# Patient Record
Sex: MALE | Race: WHITE | NOT HISPANIC OR LATINO | Employment: UNEMPLOYED | URBAN - METROPOLITAN AREA
[De-identification: names, ages, dates, MRNs, and addresses within clinical notes are randomized per-mention and may not be internally consistent; named-entity substitution may affect disease eponyms.]

---

## 2019-05-16 ENCOUNTER — HOSPITAL ENCOUNTER (OUTPATIENT)
Dept: GENERAL RADIOLOGY | Facility: HOSPITAL | Age: 49
Discharge: HOME | End: 2019-05-16

## 2020-07-05 ENCOUNTER — HOSPITAL ENCOUNTER (EMERGENCY)
Facility: HOSPITAL | Age: 50
Discharge: HOME | End: 2020-07-05
Attending: EMERGENCY MEDICINE | Admitting: EMERGENCY MEDICINE

## 2020-07-05 VITALS
HEART RATE: 71 BPM | RESPIRATION RATE: 18 BRPM | OXYGEN SATURATION: 96 % | HEIGHT: 70 IN | WEIGHT: 210 LBS | DIASTOLIC BLOOD PRESSURE: 79 MMHG | SYSTOLIC BLOOD PRESSURE: 120 MMHG | TEMPERATURE: 98.2 F | BODY MASS INDEX: 30.06 KG/M2

## 2020-07-05 DIAGNOSIS — T67.5XXA HEAT EXHAUSTION, INITIAL ENCOUNTER: Primary | ICD-10-CM

## 2020-07-05 LAB
ALBUMIN SERPL-MCNC: 4.7 G/DL (ref 3.5–5.2)
ALBUMIN/GLOB SERPL: 1.4 G/DL
ALP SERPL-CCNC: 101 U/L (ref 39–117)
ALT SERPL W P-5'-P-CCNC: 18 U/L (ref 1–41)
ANION GAP SERPL CALCULATED.3IONS-SCNC: 14 MMOL/L (ref 5–15)
APTT PPP: 27.9 SECONDS (ref 24–31)
AST SERPL-CCNC: 18 U/L (ref 1–40)
BASOPHILS # BLD AUTO: 0.1 10*3/MM3 (ref 0–0.2)
BASOPHILS NFR BLD AUTO: 0.9 % (ref 0–1.5)
BILIRUB SERPL-MCNC: 1.1 MG/DL (ref 0.2–1.2)
BUN SERPL-MCNC: 15 MG/DL (ref 6–20)
BUN SERPL-MCNC: ABNORMAL MG/DL
BUN/CREAT SERPL: ABNORMAL
CALCIUM SPEC-SCNC: 10 MG/DL (ref 8.6–10.5)
CHLORIDE SERPL-SCNC: 106 MMOL/L (ref 98–107)
CK SERPL-CCNC: 118 U/L (ref 20–200)
CO2 SERPL-SCNC: 23 MMOL/L (ref 22–29)
CREAT SERPL-MCNC: 1.16 MG/DL (ref 0.76–1.27)
DEPRECATED RDW RBC AUTO: 45.9 FL (ref 37–54)
EOSINOPHIL # BLD AUTO: 0.2 10*3/MM3 (ref 0–0.4)
EOSINOPHIL NFR BLD AUTO: 1.9 % (ref 0.3–6.2)
ERYTHROCYTE [DISTWIDTH] IN BLOOD BY AUTOMATED COUNT: 14.2 % (ref 12.3–15.4)
GFR SERPL CREATININE-BSD FRML MDRD: 66 ML/MIN/1.73
GLOBULIN UR ELPH-MCNC: 3.3 GM/DL
GLUCOSE SERPL-MCNC: 134 MG/DL (ref 65–99)
HCT VFR BLD AUTO: 46.2 % (ref 37.5–51)
HGB BLD-MCNC: 15.6 G/DL (ref 13–17.7)
HOLD SPECIMEN: NORMAL
INR PPP: 1.17 (ref 0.9–1.1)
LYMPHOCYTES # BLD AUTO: 1.8 10*3/MM3 (ref 0.7–3.1)
LYMPHOCYTES NFR BLD AUTO: 21.7 % (ref 19.6–45.3)
MCH RBC QN AUTO: 30.8 PG (ref 26.6–33)
MCHC RBC AUTO-ENTMCNC: 33.7 G/DL (ref 31.5–35.7)
MCV RBC AUTO: 91.4 FL (ref 79–97)
MONOCYTES # BLD AUTO: 0.4 10*3/MM3 (ref 0.1–0.9)
MONOCYTES NFR BLD AUTO: 5.2 % (ref 5–12)
NEUTROPHILS NFR BLD AUTO: 5.8 10*3/MM3 (ref 1.7–7)
NEUTROPHILS NFR BLD AUTO: 70.3 % (ref 42.7–76)
NRBC BLD AUTO-RTO: 0.1 /100 WBC (ref 0–0.2)
PLATELET # BLD AUTO: 280 10*3/MM3 (ref 140–450)
PMV BLD AUTO: 6.7 FL (ref 6–12)
POTASSIUM SERPL-SCNC: 3.5 MMOL/L (ref 3.5–5.2)
PROT SERPL-MCNC: 8 G/DL (ref 6–8.5)
PROTHROMBIN TIME: 11.9 SECONDS (ref 9.6–11.7)
RBC # BLD AUTO: 5.05 10*6/MM3 (ref 4.14–5.8)
SODIUM SERPL-SCNC: 143 MMOL/L (ref 136–145)
TROPONIN T SERPL-MCNC: <0.01 NG/ML (ref 0–0.03)
WBC # BLD AUTO: 8.2 10*3/MM3 (ref 3.4–10.8)

## 2020-07-05 PROCEDURE — 85025 COMPLETE CBC W/AUTO DIFF WBC: CPT | Performed by: EMERGENCY MEDICINE

## 2020-07-05 PROCEDURE — 82550 ASSAY OF CK (CPK): CPT | Performed by: EMERGENCY MEDICINE

## 2020-07-05 PROCEDURE — 25010000002 THIAMINE PER 100 MG: Performed by: EMERGENCY MEDICINE

## 2020-07-05 PROCEDURE — 99284 EMERGENCY DEPT VISIT MOD MDM: CPT

## 2020-07-05 PROCEDURE — 80053 COMPREHEN METABOLIC PANEL: CPT | Performed by: EMERGENCY MEDICINE

## 2020-07-05 PROCEDURE — 85730 THROMBOPLASTIN TIME PARTIAL: CPT | Performed by: EMERGENCY MEDICINE

## 2020-07-05 PROCEDURE — 84484 ASSAY OF TROPONIN QUANT: CPT | Performed by: EMERGENCY MEDICINE

## 2020-07-05 PROCEDURE — 96365 THER/PROPH/DIAG IV INF INIT: CPT

## 2020-07-05 PROCEDURE — 85610 PROTHROMBIN TIME: CPT | Performed by: EMERGENCY MEDICINE

## 2020-07-05 PROCEDURE — 25010000002 ONDANSETRON PER 1 MG: Performed by: EMERGENCY MEDICINE

## 2020-07-05 PROCEDURE — 96375 TX/PRO/DX INJ NEW DRUG ADDON: CPT

## 2020-07-05 RX ORDER — ONDANSETRON 2 MG/ML
8 INJECTION INTRAMUSCULAR; INTRAVENOUS ONCE
Status: COMPLETED | OUTPATIENT
Start: 2020-07-05 | End: 2020-07-05

## 2020-07-05 RX ADMIN — SODIUM CHLORIDE 100 MG: 9 INJECTION, SOLUTION INTRAVENOUS at 14:59

## 2020-07-05 RX ADMIN — SODIUM CHLORIDE, SODIUM LACTATE, POTASSIUM CHLORIDE, AND CALCIUM CHLORIDE 2000 ML: 600; 310; 30; 20 INJECTION, SOLUTION INTRAVENOUS at 13:32

## 2020-07-05 RX ADMIN — ONDANSETRON 8 MG: 2 INJECTION INTRAMUSCULAR; INTRAVENOUS at 13:32

## 2020-07-05 NOTE — ED TRIAGE NOTES
Pt here via [facility]; c/o feeling sleepy, achy, nauseous s/p walking from [place] to [place], drinking water then feeling dizzy with n/v just prior to arrival.

## 2020-07-05 NOTE — ED PROVIDER NOTES
Subjective   50-year-old male states that he walked from [place] to [place].  He states that he was sweating heavily and did not have oral intake.  The patient reports he was lightheaded and nauseated.  He stated he had some mild muscle cramping.  The heat index was over 90 and he states he was walking for about 3 hours.  He reports no chest pain or palpitations.  Reports no headache or neck stiffness.  He denies substance abuse or alcohol intake.  He states that he is noncompliant with all of his medication          Review of Systems   Constitutional: Positive for diaphoresis and fatigue. Negative for chills and fever.   HENT: Negative for trouble swallowing.    Respiratory: Negative for cough, choking, chest tightness, shortness of breath, wheezing and stridor.    Cardiovascular: Negative for palpitations and leg swelling.   Gastrointestinal: Positive for nausea. Negative for abdominal pain.   Genitourinary: Negative for testicular pain.   Musculoskeletal: Negative for back pain.   All other systems reviewed and are negative.      Past Medical History:   Diagnosis Date   • Coronary artery disease        No Known Allergies    Past Surgical History:   Procedure Laterality Date   • CORONARY ANGIOPLASTY WITH STENT PLACEMENT         History reviewed. No pertinent family history.    Social History     Socioeconomic History   • Marital status: Legally      Spouse name: Not on file   • Number of children: Not on file   • Years of education: Not on file   • Highest education level: Not on file   Tobacco Use   • Smoking status: Current Every Day Smoker     Packs/day: 0.50     Types: Cigarettes   Substance and Sexual Activity   • Alcohol use: Yes     Comment: occasional   • Drug use: Yes     Types: Marijuana     Comment: occasional   • Sexual activity: Defer           Objective   Physical Exam  Alert [place] Coma Scale 15   HEENT: Pupils equal and reactive to light. Conjunctivae are not injected. normal  tympanic membranes. Oropharynx and nares are normal.   Neck: Supple. Midline trachea. No JVD. No goiter.   Chest: Clear and equal breath sounds bilaterally regular rate and rhythm without murmur or rub.   Abdomen: Positive bowel sounds nontender nondistended. No rebound or peritoneal signs. No CVA tenderness.   Extremities no clubbing cyanosis or edema motor sensory exam is normal the full range of motion is intact.  Major muscle bellies are nontender   skin: Warm and dry, no rashes or petechia.   Lymphatic: No regional lymphadenopathy. No calf pain, swelling or Kindra's sign    Procedures           ED Course  ED Course as of Jul 05 1548   Sun Jul 05, 2020   1537 I examined the patient using the appropriate personal protective equipment.      Homeless?    [TH]      ED Course User Index  [TH] Florencio Wilson MD           Labs Reviewed   COMPREHENSIVE METABOLIC PANEL - Abnormal; Notable for the following components:       Result Value    Glucose 134 (*)     All other components within normal limits    Narrative:     GFR Normal >60  Chronic Kidney Disease <60  Kidney Failure <15     PROTIME-INR - Abnormal; Notable for the following components:    Protime 11.9 (*)     INR 1.17 (*)     All other components within normal limits   TROPONIN (IN-HOUSE) - Normal    Narrative:     Troponin T Reference Range:  <= 0.03 ng/mL-   Negative for AMI  >0.03 ng/mL-     Abnormal for myocardial necrosis.  Clinicians would have to utilize clinical acumen, EKG, Troponin and serial changes to determine if it is an Acute Myocardial Infarction or myocardial injury due to an underlying chronic condition.       Results may be falsely decreased if patient taking Biotin.     CK - Normal   APTT - Normal   CBC WITH AUTO DIFFERENTIAL - Normal   BUN - Normal   CBC AND DIFFERENTIAL    Narrative:     The following orders were created for panel order CBC & Differential.  Procedure                               Abnormality         Status                      ---------                               -----------         ------                     CBC Auto Differential[[id number]]        Normal              Final result                 Please view results for these tests on the individual orders.   EXTRA TUBES    Narrative:     The following orders were created for panel order Extra Tubes.  Procedure                               Abnormality         Status                     ---------                               -----------         ------                     Gold Top - SST[[id number]]                                   Final result                 Please view results for these tests on the individual orders.   GOLD TOP - SST     Medications   thiamine (B-1) 100 mg in sodium chloride 0.9 % 100 mL IVPB (0 mg Intravenous Stopped 7/5/20 1536)   lactated ringers bolus 2,000 mL (0 mL Intravenous Stopped 7/5/20 1536)   ondansetron (ZOFRAN) injection 8 mg (8 mg Intravenous Given 7/5/20 1332)                                 MDM  Number of Diagnoses or Management Options     Amount and/or Complexity of Data Reviewed  Clinical lab tests: reviewed  Review and summarize past medical records: yes  Discuss the patient with other providers: yes  Independent visualization of images, tracings, or specimens: yes    Risk of Complications, Morbidity, and/or Mortality  Presenting problems: high  Diagnostic procedures: high  Management options: high  General comments: Stable ER course.  Patient was discharged.  He was given a referral to the on-call primary care provider and also advised of the [facility] clinic.  Importance of medical therapy for coronary artery disease was specifically discussed.  He is encouraged to take a daily baby aspirin.  He was advised to avoid heat exposure, work or becoming dehydrated for the next 2 days.  The patient was stable at discharge and vocalized understanding of discharge instructions and warning        Final diagnoses:   Heat exhaustion, initial  encounter            Florencio Wilson MD  07/05/20 1544

## 2020-07-05 NOTE — DISCHARGE INSTRUCTIONS
Rest next 48 hours, avoid heat exposure, work  Plenty of fluids next 48 hours  Tylenol as needed for discomfort  You need to take a baby aspirin every day  Follow-up is important to get back on your medication, call clinics above

## 2021-09-11 ENCOUNTER — HOSPITAL ENCOUNTER (EMERGENCY)
Facility: HOSPITAL | Age: 51
Discharge: HOME | End: 2021-09-11
Attending: EMERGENCY MEDICINE | Admitting: EMERGENCY MEDICINE

## 2021-09-11 ENCOUNTER — APPOINTMENT (OUTPATIENT)
Dept: CT IMAGING | Facility: HOSPITAL | Age: 51
End: 2021-09-11

## 2021-09-11 VITALS
HEART RATE: 81 BPM | TEMPERATURE: 97.8 F | OXYGEN SATURATION: 100 % | DIASTOLIC BLOOD PRESSURE: 74 MMHG | BODY MASS INDEX: 29.63 KG/M2 | HEIGHT: 70 IN | WEIGHT: 207 LBS | RESPIRATION RATE: 16 BRPM | SYSTOLIC BLOOD PRESSURE: 110 MMHG

## 2021-09-11 DIAGNOSIS — M54.42 ACUTE LEFT-SIDED LOW BACK PAIN WITH LEFT-SIDED SCIATICA: Primary | ICD-10-CM

## 2021-09-11 PROCEDURE — 25010000002 HYDROMORPHONE PER 4 MG: Performed by: EMERGENCY MEDICINE

## 2021-09-11 PROCEDURE — 63710000001 ONDANSETRON ODT 4 MG TABLET DISPERSIBLE: Performed by: EMERGENCY MEDICINE

## 2021-09-11 PROCEDURE — 25010000002 METHYLPREDNISOLONE PER 80 MG: Performed by: EMERGENCY MEDICINE

## 2021-09-11 PROCEDURE — 96372 THER/PROPH/DIAG INJ SC/IM: CPT

## 2021-09-11 PROCEDURE — 99283 EMERGENCY DEPT VISIT LOW MDM: CPT

## 2021-09-11 PROCEDURE — 72131 CT LUMBAR SPINE W/O DYE: CPT

## 2021-09-11 RX ORDER — CYCLOBENZAPRINE HCL 10 MG
10 TABLET ORAL 3 TIMES DAILY PRN
Qty: 12 TABLET | Refills: 0 | Status: SHIPPED | OUTPATIENT
Start: 2021-09-11

## 2021-09-11 RX ORDER — HYDROMORPHONE HCL 110MG/55ML
1 PATIENT CONTROLLED ANALGESIA SYRINGE INTRAVENOUS ONCE
Status: COMPLETED | OUTPATIENT
Start: 2021-09-11 | End: 2021-09-11

## 2021-09-11 RX ORDER — ONDANSETRON 4 MG/1
4 TABLET, ORALLY DISINTEGRATING ORAL ONCE
Status: COMPLETED | OUTPATIENT
Start: 2021-09-11 | End: 2021-09-11

## 2021-09-11 RX ORDER — HYDROCODONE BITARTRATE AND ACETAMINOPHEN 5; 325 MG/1; MG/1
1 TABLET ORAL EVERY 6 HOURS PRN
Qty: 12 TABLET | Refills: 0 | Status: SHIPPED | OUTPATIENT
Start: 2021-09-11

## 2021-09-11 RX ORDER — MELOXICAM 7.5 MG/1
7.5 TABLET ORAL DAILY
Qty: 12 TABLET | Refills: 0 | Status: SHIPPED | OUTPATIENT
Start: 2021-09-11

## 2021-09-11 RX ORDER — METHYLPREDNISOLONE ACETATE 80 MG/ML
80 INJECTION, SUSPENSION INTRA-ARTICULAR; INTRALESIONAL; INTRAMUSCULAR; SOFT TISSUE ONCE
Status: COMPLETED | OUTPATIENT
Start: 2021-09-11 | End: 2021-09-11

## 2021-09-11 RX ADMIN — METHYLPREDNISOLONE ACETATE 80 MG: 80 INJECTION, SUSPENSION INTRA-ARTICULAR; INTRALESIONAL; INTRAMUSCULAR; SOFT TISSUE at 22:28

## 2021-09-11 RX ADMIN — ONDANSETRON 4 MG: 4 TABLET, ORALLY DISINTEGRATING ORAL at 19:47

## 2021-09-11 RX ADMIN — HYDROMORPHONE HYDROCHLORIDE 1 MG: 2 INJECTION, SOLUTION INTRAMUSCULAR; INTRAVENOUS; SUBCUTANEOUS at 19:47

## 2021-09-12 NOTE — DISCHARGE INSTRUCTIONS
Heating pad no heavy lifting  Return for loss of bladder bowel control abdominal pain black or bloody stool bloody urine dizziness passing out weakness numbness to extremity unable to walk uncontrolled pain fevers or chills or any other new or worse problems or concerns  Mobic Flexeril [place] sent to your pharmacy

## 2021-09-12 NOTE — ED PROVIDER NOTES
Subjective   Chief complaint severe lower back pain into left hip    History of present illness this is a 51-year-old male who states he has had several fractures in his lower back who was lifting up the freezer today when he felt a pop and had severe pain in his lower back to his left hip.  This happened several hours ago and is progressively gotten worse.  Is aching throbbing in nature worse with movement better with rest.  No fever no chills no night sweats no loss of bladder bowel control no numbness or tingling.  No abdominal pain dizziness or syncope.  No black or bloody stool no urinary problems no other associated symptoms.          Review of Systems   Constitutional: Negative for chills and fever.   Eyes: Negative for photophobia and visual disturbance.   Respiratory: Negative for chest tightness and shortness of breath.    Gastrointestinal: Negative for abdominal pain, blood in stool and vomiting.   Genitourinary: Negative for difficulty urinating and dysuria.   Musculoskeletal: Positive for back pain. Negative for neck pain.   Skin: Negative for color change and rash.   Neurological: Negative for dizziness and light-headedness.   Psychiatric/Behavioral: Negative for agitation and behavioral problems.       Past Medical History:   Diagnosis Date   • Coronary artery disease        No Known Allergies    Past Surgical History:   Procedure Laterality Date   • CORONARY ANGIOPLASTY WITH STENT PLACEMENT         No family history on file.    Social History     Socioeconomic History   • Marital status: Legally      Spouse name: Not on file   • Number of children: Not on file   • Years of education: Not on file   • Highest education level: Not on file   Tobacco Use   • Smoking status: Current Every Day Smoker     Packs/day: 0.50     Types: Cigarettes   Substance and Sexual Activity   • Alcohol use: Yes     Comment: occasional   • Drug use: Yes     Types: Marijuana     Comment: occasional   • Sexual  activity: Defer     Prior to Admission medications    Medication Sig Start Date End Date Taking? Authorizing Provider   cyclobenzaprine (FLEXERIL) 10 MG tablet Take 1 tablet by mouth 3 (Three) Times a Day As Needed for Muscle Spasms. 9/11/21   Martir Oliveros MD   HYDROcodone-acetaminophen (NORCO) 5-325 MG per tablet Take 1 tablet by mouth Every 6 (Six) Hours As Needed for Severe Pain . 9/11/21   Martir Oliveros MD   meloxicam (MOBIC) 7.5 MG tablet Take 1 tablet by mouth Daily. 9/11/21   Martir Oliveros MD         Patient takes no home medication  Objective   Physical Exam  51-year-old awake alert no acute distress.  But moderate pain he is walking in the room without difficulty  HEENT extraocular muscles are intact pupils equal round reactive sclera clear  Neck supple no adenopathy no JVD  Lungs clear no retraction  Heart regular without murmur  Back no CVA tenderness no direct cervical thoracic lumbar spine tenderness noted no crepitus no subcutaneous air is no pain to palpation percussion.  There is some muscle spasm noted to the left paralumbar area into the left posterior hip buttock area is not red or hot or fevers.  There is no saddle anesthesias good sensation is noted throughout this entire area.  Extremities pulses equal upper and lower extremities no edema cords or Homans' sign or evidence of DVT.  Skin warm dry without rashes or cellulitic change.  Range of motion patient has a hard time straightening out and hard time bending over side side motor somewhat limited.  Is reproducible worse with activity.  Neurologic awake alert follows commands motor strength normal no facial asymmetry normal speech he walks without ataxia.  Negative straight leg testing toes up-and-down including big toe dorsiflex plantarflex at difficulty.  Good sensation throughout the legs and saddle area reflexes noted 1+ bilateral knees and ankles.  Motor strength normal.  Abdomen soft nontender good bowel sounds no peritoneal  findings or pulsatile mass or bruits no hernias.  Procedures           ED Course        No radiology results for the last day  Medications   methylPREDNISolone acetate (DEPO-medrol) injection 80 mg (has no administration in time range)   ondansetron ODT (ZOFRAN-ODT) disintegrating tablet 4 mg (4 mg Oral Given 9/11/21 1947)   HYDROmorphone (DILAUDID) injection 1 mg (1 mg Intramuscular Given 9/11/21 1947)                                            MDM  Number of Diagnoses or Management Options  Acute left-sided low back pain with left-sided sciatica: new and requires workup  Diagnosis management comments: Medical decision made.  Patient had the above exam evaluation given Dilaudid 1 mg IM Zofran 4 mg p.o. and Depo-Medrol 80 mg IM.  Patient underwent a CT scan no fracture was noted.  Minimal broad-based disc protrusion at T12-L1 minimal broad-based protrusion L1-L2 minimal broad-based protrusion L2-L3 mild facet arthropathy.  Mild facet arthropathy and mild broad based protrusion at L3-L4 some mild canal narrowing of the thecal sac at 8 mm at the L3-L4 area.  These degenerative changes and mild disc protrusions occur at L4 L5-S1 as well with some mild bilateral foraminal narrowing and AP thecal sac diameter of 8 mm.  Patient repeat exam is feeling better.  There is no evidence of cord compression no evidence suggest epidural abscess by history and no evidence of spinal cord lesion currently by my findings.  No evidence of acute intra-abdominal process no evidence suggest acute aortic dissection or aneurysm.  He has good and equal pulses are his upper extremities motor strength normal is no pulsatile masses and no pain throughout the abdomen this is reproducible pain in the lower lumbar area into the left hip and is worse with movement.  Patient was feeling better he was referred for outpatient neurosurgery and family doctor we talked about what to return for and he voices understanding inspect reviewed by me was  unremarkable.  Patient was given Norco instructed on appropriate use of that and also Mobic just for short-term for the next few days and a muscle relaxer stable unremarkable ER course improved       Amount and/or Complexity of Data Reviewed  Tests in the radiology section of CPT®: reviewed    Risk of Complications, Morbidity, and/or Mortality  Presenting problems: moderate  Diagnostic procedures: moderate  Management options: moderate        Final diagnoses:   Acute left-sided low back pain with left-sided sciatica       ED Disposition  ED Disposition     ED Disposition Condition Comment    Discharge Stable           PATIENT CONNECTION - [facility] [address]  [phone]  In 2 days      Brad Decker MD  [facility] [address] [place] [address]  [phone]    In 1 week           Medication List      New Prescriptions    cyclobenzaprine 10 MG tablet  Commonly known as: FLEXERIL  Take 1 tablet by mouth 3 (Three) Times a Day As Needed for Muscle Spasms.     HYDROcodone-acetaminophen 5-325 MG per tablet  Commonly known as: NORCO  Take 1 tablet by mouth Every 6 (Six) Hours As Needed for Severe Pain .     meloxicam 7.5 MG tablet  Commonly known as: MOBIC  Take 1 tablet by mouth Daily.           Where to Get Your Medications      These medications were sent to [id number] - [place], [place] - [address] AT [facility] - [phone]  - [phone] FX  [address] [place] [address]    Phone: [phone]   · cyclobenzaprine 10 MG tablet  · HYDROcodone-acetaminophen 5-325 MG per tablet  · meloxicam 7.5 MG tablet          Martir Oliveros MD  09/11/21 5055

## 2023-02-08 ENCOUNTER — APPOINTMENT (OUTPATIENT)
Dept: GENERAL RADIOLOGY | Facility: HOSPITAL | Age: 53
End: 2023-02-08
Payer: MEDICAID

## 2023-02-08 ENCOUNTER — HOSPITAL ENCOUNTER (EMERGENCY)
Facility: HOSPITAL | Age: 53
Discharge: LEFT BEFORE DISCHARGE | End: 2023-02-08
Attending: EMERGENCY MEDICINE | Admitting: EMERGENCY MEDICINE
Payer: MEDICAID

## 2023-02-08 VITALS
HEIGHT: 71 IN | OXYGEN SATURATION: 99 % | BODY MASS INDEX: 25.06 KG/M2 | TEMPERATURE: 98 F | RESPIRATION RATE: 15 BRPM | HEART RATE: 77 BPM | DIASTOLIC BLOOD PRESSURE: 78 MMHG | WEIGHT: 179 LBS | SYSTOLIC BLOOD PRESSURE: 133 MMHG

## 2023-02-08 LAB
ALBUMIN SERPL-MCNC: 3.8 G/DL (ref 3.5–5.2)
ALBUMIN/GLOB SERPL: 1.2 G/DL
ALP SERPL-CCNC: 82 U/L (ref 39–117)
ALT SERPL W P-5'-P-CCNC: 7 U/L (ref 1–41)
ANION GAP SERPL CALCULATED.3IONS-SCNC: 12 MMOL/L (ref 5–15)
AST SERPL-CCNC: 10 U/L (ref 1–40)
BASOPHILS # BLD AUTO: 0.1 10*3/MM3 (ref 0–0.2)
BASOPHILS NFR BLD AUTO: 1.4 % (ref 0–1.5)
BILIRUB SERPL-MCNC: 0.4 MG/DL (ref 0–1.2)
BUN SERPL-MCNC: 13 MG/DL (ref 6–20)
BUN/CREAT SERPL: 13.7 (ref 7–25)
CALCIUM SPEC-SCNC: 9.5 MG/DL (ref 8.6–10.5)
CHLORIDE SERPL-SCNC: 101 MMOL/L (ref 98–107)
CO2 SERPL-SCNC: 24 MMOL/L (ref 22–29)
CREAT SERPL-MCNC: 0.95 MG/DL (ref 0.76–1.27)
DEPRECATED RDW RBC AUTO: 43.8 FL (ref 37–54)
EGFRCR SERPLBLD CKD-EPI 2021: 95.1 ML/MIN/1.73
EOSINOPHIL # BLD AUTO: 0.3 10*3/MM3 (ref 0–0.4)
EOSINOPHIL NFR BLD AUTO: 3.2 % (ref 0.3–6.2)
ERYTHROCYTE [DISTWIDTH] IN BLOOD BY AUTOMATED COUNT: 14.2 % (ref 12.3–15.4)
GLOBULIN UR ELPH-MCNC: 3.1 GM/DL
GLUCOSE SERPL-MCNC: 100 MG/DL (ref 65–99)
HCT VFR BLD AUTO: 42.4 % (ref 37.5–51)
HGB BLD-MCNC: 13.8 G/DL (ref 13–17.7)
LYMPHOCYTES # BLD AUTO: 3.7 10*3/MM3 (ref 0.7–3.1)
LYMPHOCYTES NFR BLD AUTO: 36.3 % (ref 19.6–45.3)
MCH RBC QN AUTO: 29.1 PG (ref 26.6–33)
MCHC RBC AUTO-ENTMCNC: 32.6 G/DL (ref 31.5–35.7)
MCV RBC AUTO: 89.4 FL (ref 79–97)
MONOCYTES # BLD AUTO: 0.6 10*3/MM3 (ref 0.1–0.9)
MONOCYTES NFR BLD AUTO: 5.7 % (ref 5–12)
NEUTROPHILS NFR BLD AUTO: 5.4 10*3/MM3 (ref 1.7–7)
NEUTROPHILS NFR BLD AUTO: 53.4 % (ref 42.7–76)
NRBC BLD AUTO-RTO: 0 /100 WBC (ref 0–0.2)
NT-PROBNP SERPL-MCNC: 119.2 PG/ML (ref 0–900)
PLATELET # BLD AUTO: 423 10*3/MM3 (ref 140–450)
PMV BLD AUTO: 6.3 FL (ref 6–12)
POTASSIUM SERPL-SCNC: 4.2 MMOL/L (ref 3.5–5.2)
PROT SERPL-MCNC: 6.9 G/DL (ref 6–8.5)
QT INTERVAL: 396 MS
RBC # BLD AUTO: 4.74 10*6/MM3 (ref 4.14–5.8)
SODIUM SERPL-SCNC: 137 MMOL/L (ref 136–145)
TROPONIN T SERPL HS-MCNC: <6 NG/L
WBC NRBC COR # BLD: 10.1 10*3/MM3 (ref 3.4–10.8)

## 2023-02-08 PROCEDURE — 80053 COMPREHEN METABOLIC PANEL: CPT | Performed by: NURSE PRACTITIONER

## 2023-02-08 PROCEDURE — 83880 ASSAY OF NATRIURETIC PEPTIDE: CPT | Performed by: NURSE PRACTITIONER

## 2023-02-08 PROCEDURE — 93005 ELECTROCARDIOGRAM TRACING: CPT | Performed by: EMERGENCY MEDICINE

## 2023-02-08 PROCEDURE — 99283 EMERGENCY DEPT VISIT LOW MDM: CPT

## 2023-02-08 PROCEDURE — 93005 ELECTROCARDIOGRAM TRACING: CPT

## 2023-02-08 PROCEDURE — 84484 ASSAY OF TROPONIN QUANT: CPT | Performed by: NURSE PRACTITIONER

## 2023-02-08 PROCEDURE — 85025 COMPLETE CBC W/AUTO DIFF WBC: CPT | Performed by: NURSE PRACTITIONER

## 2023-02-08 PROCEDURE — 71045 X-RAY EXAM CHEST 1 VIEW: CPT

## 2023-02-08 RX ORDER — SODIUM CHLORIDE 0.9 % (FLUSH) 0.9 %
10 SYRINGE (ML) INJECTION AS NEEDED
Status: DISCONTINUED | OUTPATIENT
Start: 2023-02-08 | End: 2023-02-09 | Stop reason: HOSPADM

## 2023-02-08 NOTE — ED PROVIDER NOTES
Subjective      Provider in Triage Note  Patient is a 53-year-old gentleman who presents with chest pain nausea vomiting and body aches and pains that started this morning he arrived via EMS-    He states his pain is substernal he states that he has coronary artery disease and has had a stent placement in the past.      History of Present Illness    Review of Systems    Past Medical History:   Diagnosis Date   • Coronary artery disease        No Known Allergies    Past Surgical History:   Procedure Laterality Date   • CORONARY ANGIOPLASTY WITH STENT PLACEMENT         History reviewed. No pertinent family history.    Social History     Socioeconomic History   • Marital status: Legally    Tobacco Use   • Smoking status: Every Day     Packs/day: 0.50     Types: Cigarettes   Substance and Sexual Activity   • Alcohol use: Yes     Comment: occasional   • Drug use: Yes     Types: Marijuana     Comment: occasional   • Sexual activity: Defer           Objective   Physical Exam    Procedures           ED Course  ED Course as of 02/19/23 1100   Sun Feb 19, 2023   1100 Patient eloped prior to being laced in an ER room [KW]      ED Course User Index  [KW] Deb Jacques, APRN                                           [facility]    Final diagnoses:   None       ED Disposition  ED Disposition     ED Disposition   Eloped    Condition   --    Comment   --             No follow-up provider specified.       Medication List      No changes were made to your prescriptions during this visit.          Deb Jacques, APRN  02/19/23 1101

## 2023-06-11 ENCOUNTER — HOSPITAL ENCOUNTER (OUTPATIENT)
Facility: HOSPITAL | Age: 53
Setting detail: OBSERVATION
Discharge: HOME | End: 2023-06-12
Attending: EMERGENCY MEDICINE | Admitting: EMERGENCY MEDICINE
Payer: MEDICAID

## 2023-06-11 ENCOUNTER — APPOINTMENT (OUTPATIENT)
Dept: CT IMAGING | Facility: HOSPITAL | Age: 53
End: 2023-06-11
Payer: MEDICAID

## 2023-06-11 DIAGNOSIS — S00.03XA CONTUSION OF SCALP, INITIAL ENCOUNTER: ICD-10-CM

## 2023-06-11 DIAGNOSIS — D72.829 LEUKOCYTOSIS, UNSPECIFIED TYPE: ICD-10-CM

## 2023-06-11 DIAGNOSIS — R19.7 DIARRHEA, UNSPECIFIED TYPE: ICD-10-CM
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
## 2023-06-11 DIAGNOSIS — S20.219A CONTUSION OF CHEST WALL, UNSPECIFIED LATERALITY, INITIAL ENCOUNTER: ICD-10-CM

## 2023-06-11 DIAGNOSIS — R11.15 PERSISTENT VOMITING: Primary | ICD-10-CM

## 2023-06-11 DIAGNOSIS — S30.1XXA CONTUSION OF ABDOMINAL WALL, INITIAL ENCOUNTER: ICD-10-CM

## 2023-06-11 LAB
ALBUMIN SERPL-MCNC: 4.2 G/DL (ref 3.5–5.2)
ALBUMIN/GLOB SERPL: 0.9 G/DL
ALP SERPL-CCNC: 95 U/L (ref 39–117)
ALT SERPL W P-5'-P-CCNC: 27 U/L (ref 1–41)
AMYLASE SERPL-CCNC: 48 U/L (ref 28–100)
ANION GAP SERPL CALCULATED.3IONS-SCNC: 15 MMOL/L (ref 5–15)
AST SERPL-CCNC: 57 U/L (ref 1–40)
BACTERIA UR QL AUTO: ABNORMAL /HPF
BASOPHILS # BLD AUTO: 0.2 10*3/MM3 (ref 0–0.2)
BASOPHILS NFR BLD AUTO: 0.9 % (ref 0–1.5)
BILIRUB SERPL-MCNC: 1.4 MG/DL (ref 0–1.2)
BILIRUB UR QL STRIP: NEGATIVE
BUN SERPL-MCNC: 28 MG/DL (ref 6–20)
BUN/CREAT SERPL: 22.4 (ref 7–25)
CALCIUM SPEC-SCNC: 9.8 MG/DL (ref 8.6–10.5)
CHLORIDE SERPL-SCNC: 95 MMOL/L (ref 98–107)
CLARITY UR: CLEAR
CO2 SERPL-SCNC: 24 MMOL/L (ref 22–29)
COLOR UR: ABNORMAL
CREAT SERPL-MCNC: 1.25 MG/DL (ref 0.76–1.27)
DEPRECATED RDW RBC AUTO: 48.1 FL (ref 37–54)
EGFRCR SERPLBLD CKD-EPI 2021: 68.4 ML/MIN/1.73
EOSINOPHIL # BLD AUTO: 0 10*3/MM3 (ref 0–0.4)
EOSINOPHIL NFR BLD AUTO: 0.1 % (ref 0.3–6.2)
ERYTHROCYTE [DISTWIDTH] IN BLOOD BY AUTOMATED COUNT: 15.5 % (ref 12.3–15.4)
GLOBULIN UR ELPH-MCNC: 4.5 GM/DL
GLUCOSE SERPL-MCNC: 142 MG/DL (ref 65–99)
GLUCOSE UR STRIP-MCNC: NEGATIVE MG/DL
HCT VFR BLD AUTO: 38.8 % (ref 37.5–51)
HGB BLD-MCNC: 12.8 G/DL (ref 13–17.7)
HGB UR QL STRIP.AUTO: ABNORMAL
HYALINE CASTS UR QL AUTO: ABNORMAL /LPF
KETONES UR QL STRIP: ABNORMAL
LEUKOCYTE ESTERASE UR QL STRIP.AUTO: NEGATIVE
LIPASE SERPL-CCNC: 23 U/L (ref 13–60)
LYMPHOCYTES # BLD AUTO: 1.5 10*3/MM3 (ref 0.7–3.1)
LYMPHOCYTES NFR BLD AUTO: 6 % (ref 19.6–45.3)
MCH RBC QN AUTO: 28 PG (ref 26.6–33)
MCHC RBC AUTO-ENTMCNC: 32.9 G/DL (ref 31.5–35.7)
MCV RBC AUTO: 85 FL (ref 79–97)
MONOCYTES # BLD AUTO: 0.7 10*3/MM3 (ref 0.1–0.9)
MONOCYTES NFR BLD AUTO: 2.9 % (ref 5–12)
MUCOUS THREADS URNS QL MICRO: ABNORMAL /HPF
NEUTROPHILS NFR BLD AUTO: 22.1 10*3/MM3 (ref 1.7–7)
NEUTROPHILS NFR BLD AUTO: 90.1 % (ref 42.7–76)
NITRITE UR QL STRIP: NEGATIVE
NRBC BLD AUTO-RTO: 0 /100 WBC (ref 0–0.2)
PH UR STRIP.AUTO: 5.5 [PH] (ref 5–8)
PLATELET # BLD AUTO: 424 10*3/MM3 (ref 140–450)
PMV BLD AUTO: 6.2 FL (ref 6–12)
POTASSIUM SERPL-SCNC: 3.9 MMOL/L (ref 3.5–5.2)
PROT SERPL-MCNC: 8.7 G/DL (ref 6–8.5)
PROT UR QL STRIP: ABNORMAL
RBC # BLD AUTO: 4.56 10*6/MM3 (ref 4.14–5.8)
RBC # UR STRIP: ABNORMAL /HPF
REF LAB TEST METHOD: ABNORMAL
SODIUM SERPL-SCNC: 134 MMOL/L (ref 136–145)
SP GR UR STRIP: 1.06 (ref 1–1.03)
SQUAMOUS #/AREA URNS HPF: ABNORMAL /HPF
UROBILINOGEN UR QL STRIP: ABNORMAL
WBC # UR STRIP: ABNORMAL /HPF
WBC CASTS #/AREA URNS LPF: ABNORMAL /LPF
WBC NRBC COR # BLD: 24.6 10*3/MM3 (ref 3.4–10.8)

## 2023-06-11 PROCEDURE — 25010000002 METOCLOPRAMIDE PER 10 MG: Performed by: EMERGENCY MEDICINE

## 2023-06-11 PROCEDURE — 80053 COMPREHEN METABOLIC PANEL: CPT | Performed by: EMERGENCY MEDICINE

## 2023-06-11 PROCEDURE — 25010000002 MORPHINE PER 10 MG: Performed by: EMERGENCY MEDICINE

## 2023-06-11 PROCEDURE — 96376 TX/PRO/DX INJ SAME DRUG ADON: CPT

## 2023-06-11 PROCEDURE — 83690 ASSAY OF LIPASE: CPT | Performed by: EMERGENCY MEDICINE

## 2023-06-11 PROCEDURE — 70450 CT HEAD/BRAIN W/O DYE: CPT

## 2023-06-11 PROCEDURE — 87086 URINE CULTURE/COLONY COUNT: CPT | Performed by: NURSE PRACTITIONER

## 2023-06-11 PROCEDURE — 71260 CT THORAX DX C+: CPT

## 2023-06-11 PROCEDURE — 90471 IMMUNIZATION ADMIN: CPT | Performed by: EMERGENCY MEDICINE

## 2023-06-11 PROCEDURE — 25510000001 IOPAMIDOL PER 1 ML: Performed by: EMERGENCY MEDICINE

## 2023-06-11 PROCEDURE — 99285 EMERGENCY DEPT VISIT HI MDM: CPT

## 2023-06-11 PROCEDURE — 85025 COMPLETE CBC W/AUTO DIFF WBC: CPT | Performed by: EMERGENCY MEDICINE

## 2023-06-11 PROCEDURE — 25010000002 TETANUS-DIPHTH-ACELL PERTUSSIS 5-2.5-18.5 LF-MCG/0.5 SUSPENSION PREFILLED SYRINGE: Performed by: EMERGENCY MEDICINE

## 2023-06-11 PROCEDURE — G0378 HOSPITAL OBSERVATION PER HR: HCPCS

## 2023-06-11 PROCEDURE — 72125 CT NECK SPINE W/O DYE: CPT

## 2023-06-11 PROCEDURE — 90715 TDAP VACCINE 7 YRS/> IM: CPT | Performed by: EMERGENCY MEDICINE

## 2023-06-11 PROCEDURE — 25010000002 ONDANSETRON PER 1 MG: Performed by: EMERGENCY MEDICINE

## 2023-06-11 PROCEDURE — 81001 URINALYSIS AUTO W/SCOPE: CPT | Performed by: EMERGENCY MEDICINE

## 2023-06-11 PROCEDURE — 82150 ASSAY OF AMYLASE: CPT | Performed by: EMERGENCY MEDICINE

## 2023-06-11 PROCEDURE — 96374 THER/PROPH/DIAG INJ IV PUSH: CPT

## 2023-06-11 PROCEDURE — 96375 TX/PRO/DX INJ NEW DRUG ADDON: CPT

## 2023-06-11 PROCEDURE — 74177 CT ABD & PELVIS W/CONTRAST: CPT

## 2023-06-11 RX ORDER — ONDANSETRON 2 MG/ML
8 INJECTION INTRAMUSCULAR; INTRAVENOUS ONCE
Status: COMPLETED | OUTPATIENT
Start: 2023-06-11 | End: 2023-06-11

## 2023-06-11 RX ORDER — ONDANSETRON 2 MG/ML
4 INJECTION INTRAMUSCULAR; INTRAVENOUS EVERY 6 HOURS PRN
Status: DISCONTINUED | OUTPATIENT
Start: 2023-06-11 | End: 2023-06-12

## 2023-06-11 RX ORDER — ACETAMINOPHEN 325 MG/1
650 TABLET ORAL EVERY 4 HOURS PRN
Status: DISCONTINUED | OUTPATIENT
Start: 2023-06-11 | End: 2023-06-12 | Stop reason: HOSPADM

## 2023-06-11 RX ORDER — BISACODYL 10 MG
10 SUPPOSITORY, RECTAL RECTAL DAILY PRN
Status: DISCONTINUED | OUTPATIENT
Start: 2023-06-11 | End: 2023-06-12 | Stop reason: HOSPADM

## 2023-06-11 RX ORDER — CHOLECALCIFEROL (VITAMIN D3) 125 MCG
5 CAPSULE ORAL NIGHTLY PRN
Status: DISCONTINUED | OUTPATIENT
Start: 2023-06-11 | End: 2023-06-12 | Stop reason: HOSPADM

## 2023-06-11 RX ORDER — METOCLOPRAMIDE HYDROCHLORIDE 5 MG/ML
10 INJECTION INTRAMUSCULAR; INTRAVENOUS ONCE
Status: COMPLETED | OUTPATIENT
Start: 2023-06-11 | End: 2023-06-11

## 2023-06-11 RX ORDER — POLYETHYLENE GLYCOL 3350 17 G/17G
17 POWDER, FOR SOLUTION ORAL DAILY PRN
Status: DISCONTINUED | OUTPATIENT
Start: 2023-06-11 | End: 2023-06-12 | Stop reason: HOSPADM

## 2023-06-11 RX ORDER — IBUPROFEN 400 MG/1
400 TABLET ORAL EVERY 6 HOURS PRN
COMMUNITY

## 2023-06-11 RX ORDER — SODIUM CHLORIDE 0.9 % (FLUSH) 0.9 %
10 SYRINGE (ML) INJECTION AS NEEDED
Status: DISCONTINUED | OUTPATIENT
Start: 2023-06-11 | End: 2023-06-12 | Stop reason: HOSPADM

## 2023-06-11 RX ORDER — SODIUM CHLORIDE 9 MG/ML
125 INJECTION, SOLUTION INTRAVENOUS CONTINUOUS
Status: DISCONTINUED | OUTPATIENT
Start: 2023-06-11 | End: 2023-06-12 | Stop reason: HOSPADM

## 2023-06-11 RX ORDER — BISACODYL 5 MG/1
5 TABLET, DELAYED RELEASE ORAL DAILY PRN
Status: DISCONTINUED | OUTPATIENT
Start: 2023-06-11 | End: 2023-06-12 | Stop reason: HOSPADM

## 2023-06-11 RX ORDER — SODIUM CHLORIDE 0.9 % (FLUSH) 0.9 %
10 SYRINGE (ML) INJECTION EVERY 12 HOURS SCHEDULED
Status: DISCONTINUED | OUTPATIENT
Start: 2023-06-11 | End: 2023-06-12 | Stop reason: HOSPADM

## 2023-06-11 RX ORDER — AMOXICILLIN 250 MG
2 CAPSULE ORAL 2 TIMES DAILY
Status: DISCONTINUED | OUTPATIENT
Start: 2023-06-11 | End: 2023-06-12 | Stop reason: HOSPADM

## 2023-06-11 RX ORDER — SODIUM CHLORIDE 9 MG/ML
40 INJECTION, SOLUTION INTRAVENOUS AS NEEDED
Status: DISCONTINUED | OUTPATIENT
Start: 2023-06-11 | End: 2023-06-12 | Stop reason: HOSPADM

## 2023-06-11 RX ADMIN — SODIUM CHLORIDE, POTASSIUM CHLORIDE, SODIUM LACTATE AND CALCIUM CHLORIDE 1000 ML: 600; 310; 30; 20 INJECTION, SOLUTION INTRAVENOUS at 17:07

## 2023-06-11 RX ADMIN — MORPHINE SULFATE 4 MG: 4 INJECTION INTRAVENOUS at 13:52

## 2023-06-11 RX ADMIN — ONDANSETRON 8 MG: 2 INJECTION INTRAMUSCULAR; INTRAVENOUS at 13:52

## 2023-06-11 RX ADMIN — MORPHINE SULFATE 4 MG: 4 INJECTION INTRAVENOUS at 17:13

## 2023-06-11 RX ADMIN — MORPHINE SULFATE 4 MG: 4 INJECTION, SOLUTION INTRAMUSCULAR; INTRAVENOUS at 21:54

## 2023-06-11 RX ADMIN — SODIUM CHLORIDE 125 ML/HR: 0.9 INJECTION, SOLUTION INTRAVENOUS at 19:51

## 2023-06-11 RX ADMIN — METOCLOPRAMIDE 10 MG: 5 INJECTION, SOLUTION INTRAMUSCULAR; INTRAVENOUS at 17:07

## 2023-06-11 RX ADMIN — SODIUM CHLORIDE, POTASSIUM CHLORIDE, SODIUM LACTATE AND CALCIUM CHLORIDE 1000 ML: 600; 310; 30; 20 INJECTION, SOLUTION INTRAVENOUS at 13:50

## 2023-06-11 RX ADMIN — IOPAMIDOL 100 ML: 755 INJECTION, SOLUTION INTRAVENOUS at 14:34

## 2023-06-11 RX ADMIN — TETANUS TOXOID, REDUCED DIPHTHERIA TOXOID AND ACELLULAR PERTUSSIS VACCINE, ADSORBED 0.5 ML: 5; 2.5; 8; 8; 2.5 SUSPENSION INTRAMUSCULAR at 18:07
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
## 2023-06-11 NOTE — ED NOTES
Multiple soft tissue abrasions and bruising to chest wall. Has laceration on top of scalp that has hardened blood and is not bleeding.

## 2023-06-12 VITALS
HEART RATE: 84 BPM | TEMPERATURE: 97.6 F | WEIGHT: 191.8 LBS | SYSTOLIC BLOOD PRESSURE: 157 MMHG | RESPIRATION RATE: 18 BRPM | DIASTOLIC BLOOD PRESSURE: 80 MMHG | OXYGEN SATURATION: 100 % | BODY MASS INDEX: 27.46 KG/M2 | HEIGHT: 70 IN

## 2023-06-12 LAB
ANION GAP SERPL CALCULATED.3IONS-SCNC: 11 MMOL/L (ref 5–15)
BASOPHILS # BLD AUTO: 0.1 10*3/MM3 (ref 0–0.2)
BASOPHILS NFR BLD AUTO: 0.5 % (ref 0–1.5)
BUN SERPL-MCNC: 19 MG/DL (ref 6–20)
BUN/CREAT SERPL: 26 (ref 7–25)
CALCIUM SPEC-SCNC: 8.2 MG/DL (ref 8.6–10.5)
CHLORIDE SERPL-SCNC: 103 MMOL/L (ref 98–107)
CO2 SERPL-SCNC: 23 MMOL/L (ref 22–29)
CREAT SERPL-MCNC: 0.73 MG/DL (ref 0.76–1.27)
DEPRECATED RDW RBC AUTO: 45.5 FL (ref 37–54)
EGFRCR SERPLBLD CKD-EPI 2021: 108.1 ML/MIN/1.73
EOSINOPHIL # BLD AUTO: 0.1 10*3/MM3 (ref 0–0.4)
EOSINOPHIL NFR BLD AUTO: 0.4 % (ref 0.3–6.2)
ERYTHROCYTE [DISTWIDTH] IN BLOOD BY AUTOMATED COUNT: 15.2 % (ref 12.3–15.4)
GLUCOSE SERPL-MCNC: 121 MG/DL (ref 65–99)
HBA1C MFR BLD: 5.3 % (ref 4.8–5.6)
HCT VFR BLD AUTO: 34.6 % (ref 37.5–51)
HGB BLD-MCNC: 11.7 G/DL (ref 13–17.7)
LYMPHOCYTES # BLD AUTO: 1.5 10*3/MM3 (ref 0.7–3.1)
LYMPHOCYTES NFR BLD AUTO: 11 % (ref 19.6–45.3)
MCH RBC QN AUTO: 29.4 PG (ref 26.6–33)
MCHC RBC AUTO-ENTMCNC: 33.9 G/DL (ref 31.5–35.7)
MCV RBC AUTO: 86.7 FL (ref 79–97)
MONOCYTES # BLD AUTO: 0.6 10*3/MM3 (ref 0.1–0.9)
MONOCYTES NFR BLD AUTO: 4.4 % (ref 5–12)
NEUTROPHILS NFR BLD AUTO: 11.2 10*3/MM3 (ref 1.7–7)
NEUTROPHILS NFR BLD AUTO: 83.7 % (ref 42.7–76)
NRBC BLD AUTO-RTO: 0.1 /100 WBC (ref 0–0.2)
PLATELET # BLD AUTO: 361 10*3/MM3 (ref 140–450)
PMV BLD AUTO: 6.3 FL (ref 6–12)
POTASSIUM SERPL-SCNC: 3.2 MMOL/L (ref 3.5–5.2)
QT INTERVAL: 437 MS
RBC # BLD AUTO: 3.99 10*6/MM3 (ref 4.14–5.8)
SODIUM SERPL-SCNC: 137 MMOL/L (ref 136–145)
WBC NRBC COR # BLD: 13.4 10*3/MM3 (ref 3.4–10.8)

## 2023-06-12 PROCEDURE — 96376 TX/PRO/DX INJ SAME DRUG ADON: CPT

## 2023-06-12 PROCEDURE — 25010000002 MORPHINE PER 10 MG: Performed by: EMERGENCY MEDICINE

## 2023-06-12 PROCEDURE — 93005 ELECTROCARDIOGRAM TRACING: CPT | Performed by: NURSE PRACTITIONER

## 2023-06-12 PROCEDURE — 80048 BASIC METABOLIC PNL TOTAL CA: CPT | Performed by: EMERGENCY MEDICINE

## 2023-06-12 PROCEDURE — 83036 HEMOGLOBIN GLYCOSYLATED A1C: CPT | Performed by: NURSE PRACTITIONER

## 2023-06-12 PROCEDURE — 85025 COMPLETE CBC W/AUTO DIFF WBC: CPT | Performed by: EMERGENCY MEDICINE

## 2023-06-12 PROCEDURE — G0378 HOSPITAL OBSERVATION PER HR: HCPCS

## 2023-06-12 PROCEDURE — 25010000002 MORPHINE PER 10 MG: Performed by: NURSE PRACTITIONER

## 2023-06-12 PROCEDURE — 25010000002 ONDANSETRON PER 1 MG: Performed by: EMERGENCY MEDICINE

## 2023-06-12 RX ORDER — ACETAMINOPHEN 325 MG/1
650 TABLET ORAL EVERY 4 HOURS PRN
Status: DISCONTINUED | OUTPATIENT
Start: 2023-06-12 | End: 2023-06-12

## 2023-06-12 RX ORDER — SCOLOPAMINE TRANSDERMAL SYSTEM 1 MG/1
1 PATCH, EXTENDED RELEASE TRANSDERMAL
Status: DISCONTINUED | OUTPATIENT
Start: 2023-06-12 | End: 2023-06-12 | Stop reason: HOSPADM

## 2023-06-12 RX ORDER — NICOTINE 21 MG/24HR
1 PATCH, TRANSDERMAL 24 HOURS TRANSDERMAL
Status: DISCONTINUED | OUTPATIENT
Start: 2023-06-12 | End: 2023-06-12 | Stop reason: HOSPADM

## 2023-06-12 RX ORDER — SODIUM CHLORIDE 0.9 % (FLUSH) 0.9 %
10 SYRINGE (ML) INJECTION AS NEEDED
Status: DISCONTINUED | OUTPATIENT
Start: 2023-06-12 | End: 2023-06-12 | Stop reason: HOSPADM

## 2023-06-12 RX ORDER — ONDANSETRON 4 MG/1
4 TABLET, FILM COATED ORAL EVERY 6 HOURS PRN
Qty: 30 TABLET | Refills: 0 | Status: SHIPPED | OUTPATIENT
Start: 2023-06-12

## 2023-06-12 RX ORDER — TRAMADOL HYDROCHLORIDE 50 MG/1
50 TABLET ORAL EVERY 6 HOURS PRN
Qty: 10 TABLET | Refills: 0 | Status: SHIPPED | OUTPATIENT
Start: 2023-06-12

## 2023-06-12 RX ORDER — SODIUM CHLORIDE 9 MG/ML
40 INJECTION, SOLUTION INTRAVENOUS AS NEEDED
Status: DISCONTINUED | OUTPATIENT
Start: 2023-06-12 | End: 2023-06-12 | Stop reason: HOSPADM

## 2023-06-12 RX ORDER — ONDANSETRON 2 MG/ML
4 INJECTION INTRAMUSCULAR; INTRAVENOUS EVERY 6 HOURS PRN
Status: DISCONTINUED | OUTPATIENT
Start: 2023-06-12 | End: 2023-06-12 | Stop reason: HOSPADM

## 2023-06-12 RX ORDER — CIPROFLOXACIN 500 MG/1
500 TABLET, FILM COATED ORAL 2 TIMES DAILY
Qty: 14 TABLET | Refills: 0 | Status: SHIPPED | OUTPATIENT
Start: 2023-06-12 | End: 2023-06-19

## 2023-06-12 RX ORDER — SODIUM CHLORIDE 0.9 % (FLUSH) 0.9 %
10 SYRINGE (ML) INJECTION EVERY 12 HOURS SCHEDULED
Status: DISCONTINUED | OUTPATIENT
Start: 2023-06-12 | End: 2023-06-12 | Stop reason: HOSPADM

## 2023-06-12 RX ORDER — ONDANSETRON 4 MG/1
4 TABLET, FILM COATED ORAL EVERY 6 HOURS PRN
Status: DISCONTINUED | OUTPATIENT
Start: 2023-06-12 | End: 2023-06-12 | Stop reason: HOSPADM

## 2023-06-12 RX ORDER — TRAMADOL HYDROCHLORIDE 50 MG/1
50 TABLET ORAL EVERY 6 HOURS PRN
Status: DISCONTINUED | OUTPATIENT
Start: 2023-06-12 | End: 2023-06-12 | Stop reason: HOSPADM

## 2023-06-12 RX ORDER — MORPHINE SULFATE 2 MG/ML
2 INJECTION, SOLUTION INTRAMUSCULAR; INTRAVENOUS EVERY 4 HOURS PRN
Status: DISCONTINUED | OUTPATIENT
Start: 2023-06-12 | End: 2023-06-12 | Stop reason: HOSPADM

## 2023-06-12 RX ORDER — POTASSIUM CHLORIDE 20 MEQ/1
40 TABLET, EXTENDED RELEASE ORAL EVERY 4 HOURS
Status: COMPLETED | OUTPATIENT
Start: 2023-06-12 | End: 2023-06-12

## 2023-06-12 RX ADMIN — TRAMADOL HYDROCHLORIDE 50 MG: 50 TABLET, FILM COATED ORAL at 10:10

## 2023-06-12 RX ADMIN — MORPHINE SULFATE 2 MG: 2 INJECTION, SOLUTION INTRAMUSCULAR; INTRAVENOUS at 13:30

## 2023-06-12 RX ADMIN — ONDANSETRON 4 MG: 2 INJECTION INTRAMUSCULAR; INTRAVENOUS at 01:54

## 2023-06-12 RX ADMIN — POTASSIUM CHLORIDE 40 MEQ: 1500 TABLET, EXTENDED RELEASE ORAL at 08:15

## 2023-06-12 RX ADMIN — Medication 10 ML: at 13:30

## 2023-06-12 RX ADMIN — SENNOSIDES AND DOCUSATE SODIUM 2 TABLET: 50; 8.6 TABLET ORAL at 12:05

## 2023-06-12 RX ADMIN — NICOTINE 1 PATCH: 21 PATCH, EXTENDED RELEASE TRANSDERMAL at 10:10

## 2023-06-12 RX ADMIN — Medication 10 ML: at 08:15

## 2023-06-12 RX ADMIN — MORPHINE SULFATE 4 MG: 4 INJECTION, SOLUTION INTRAMUSCULAR; INTRAVENOUS at 01:54

## 2023-06-12 RX ADMIN — POTASSIUM CHLORIDE 40 MEQ: 1500 TABLET, EXTENDED RELEASE ORAL at 12:05

## 2023-06-12 NOTE — DISCHARGE SUMMARY
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 dark-colored urine with increased Pacific gravity, trace ketones, small blood, protein, 0-2 RBC, 3-5 WBC, small mucus, 3-6 epithelial cell.  Urine culture pending.  Patient given IV and oral antiemetics and IV fluids, IV Rocephin.  Patient had elevated white blood cell count of 24.6 decreased 13.4.  Patient advised to follow PCP in 1 week for repeat urinalysis and lab work.  CT chest outpatient for nodule evaluation.  Testing recommendations reviewed with patient and he agrees with treatment plan.  If symptoms worsen patient call 911 or go to nearest ED.    Past Medical History:     Past Medical History:   Diagnosis Date    Coronary artery disease        Past Surgical History:     Past Surgical History:   Procedure Laterality Date    CORONARY ANGIOPLASTY WITH STENT PLACEMENT         Social History:   Social History     Socioeconomic History    Marital status: Legally    Tobacco Use    Smoking status: Every Day     Packs/day: 0.50     Types: Cigarettes   Vaping Use    Vaping Use: Every day   Substance and Sexual Activity    Alcohol use: Yes     Comment: occasional    Drug use: Yes     Types: Marijuana     Comment: occasional    Sexual activity: Defer       Procedures Performed         Consults:   Consults       No orders found for last 30 day(s).            Condition on Discharge:      Stable    Discharge Disposition  Home or Self Care    Discharge Medications     Discharge Medications        New Medications        Instructions Start Date   ciprofloxacin 500 MG tablet  Commonly known as: Cipro   500 mg, Oral, 2 Times Daily      ondansetron 4 MG tablet  Commonly known as: ZOFRAN   4 mg, Oral, Every 6 Hours PRN      traMADol 50 MG tablet  Commonly known as: ULTRAM   50 mg, Oral, Every 6 Hours PRN             Continue These Medications        Instructions Start Date   ibuprofen 400 MG tablet  Commonly known as: ADVIL,MOTRIN   400 mg, Oral, Every 6 Hours PRN               Discharge Diet:   Diet Instructions       Diet: Cardiac Diets; Low Sodium (2g); Regular Texture (IDDSI 7); Thin (IDDSI 0)      Discharge Diet: Cardiac Diets    Cardiac Diet: Low Sodium (2g)    Texture: Regular Texture (IDDSI 7)    Fluid Consistency: Thin (IDDSI 0)            Activity at Discharge:   Activity Instructions       Activity as Tolerated      Measure Blood Pressure              Follow-up Appointments  No future appointments.  Additional Instructions for the Follow-ups that You Need to Schedule       Discharge Follow-up with PCP   As directed       Currently Documented PCP:    Provider, No Known    PCP Phone Number:    None     Follow Up Details: 7-10 days         CT Chest Without Contrast   Jun 19, 2023      Release to patient: Routine Release                 Test Results Pending at Discharge  Pending Labs       Order Current Status    Hemoglobin A1c In process    Urine Culture - Urine, Urine, Clean Catch In process             Risk for Readmission (LACE) Score: 2 (6/12/2023  6:00 AM)          JAYLIN Flores  06/12/23  15:05 EDT

## 2023-06-12 NOTE — PLAN OF CARE
Goal Outcome Evaluation:    Patient denies n/v. IVF infusing. Oral electrolyte replacement followed per protocol.

## 2023-06-12 NOTE — PLAN OF CARE
Problem: Adult Inpatient Plan of Care  Goal: Plan of Care Review  Outcome: Ongoing, Progressing  Flowsheets (Taken 6/11/2023 2208)  Progress: no change  Plan of Care Reviewed With: patient  Outcome Evaluation: body aches, takes pain meds prn . wife at bedside  Goal: Patient-Specific Goal (Individualized)  Outcome: Ongoing, Progressing  Goal: Absence of Hospital-Acquired Illness or Injury  Outcome: Ongoing, Progressing  Intervention: Identify and Manage Fall Risk  Recent Flowsheet Documentation  Taken 6/11/2023 2200 by Brooke Hendrix RN  Safety Promotion/Fall Prevention: nonskid shoes/slippers when out of bed  Taken 6/11/2023 2000 by Brooke Hendrix RN  Safety Promotion/Fall Prevention:   safety round/check completed   nonskid shoes/slippers when out of bed   lighting adjusted  Intervention: Prevent Skin Injury  Recent Flowsheet Documentation  Taken 6/11/2023 2000 by Brooke Hendrix RN  Body Position: side-lying  Intervention: Prevent and Manage VTE (Venous Thromboembolism) Risk  Recent Flowsheet Documentation  Taken 6/11/2023 2200 by Brooke Hendrix RN  Activity Management:   sitting, edge of bed   bedrest  Taken 6/11/2023 2000 by Brooke Hendrix RN  Activity Management:   up ad sindhu   bedrest  Intervention: Prevent Infection  Recent Flowsheet Documentation  Taken 6/11/2023 2000 by Brooke Hendrix RN  Infection Prevention:   rest/sleep promoted   single patient room provided  Goal: Optimal Comfort and Wellbeing  Outcome: Ongoing, Progressing  Goal: Readiness for Transition of Care  Outcome: Ongoing, Progressing  Intervention: Mutually Develop Transition Plan  Recent Flowsheet Documentation  Taken 6/11/2023 1948 by Brooke Hendrix RN  Transportation Anticipated: family or friend will provide  Patient/Family Anticipated Services at Transition: none  Patient/Family Anticipates Transition to:   home   home with family  Taken 6/11/2023 1947 by Brooke Hendrix RN  Transportation  Anticipated: family or friend will provide  Patient/Family Anticipates Transition to:   home   home with family  Taken 6/11/2023 1945 by Brooke Hendrix, RITA  Equipment Currently Used at Home: none     Problem: Pain Acute  Goal: Acceptable Pain Control and Functional Ability  Outcome: Ongoing, Progressing   Goal Outcome Evaluation:  Plan of Care Reviewed With: patient        Progress: no change  Outcome Evaluation: body aches, takes pain meds prn . wife at bedside

## 2023-06-12 NOTE — CASE MANAGEMENT/SOCIAL WORK
Case Management/Social Work    Patient Name:  Danial Wild  YOB: 1970  MRN: [id number]  Admit Date:  6/11/2023        KONG called [employer] ([phone]) and spoke with Tracey. Trip ID number is [id number]. Transportation was arranged from [facility] to patient home address ([address] [place] [address] [place] [address]).  should arrive within the hour ([phone]). Upon arrival,  will call nurse's station ([phone]).    Per RN, patient can safely get in/out of a vehicle.         Electronically signed by:  Richard Villalba CMA  06/12/23 13:33 EDT    Richard Villalba  Case Management Associate  [address] [place] [address]  P: [phone]  F: [phone]

## 2023-06-12 NOTE — SIGNIFICANT NOTE
06/12/23 1459   OTHER   Discipline physical therapist   Rehab Time/Intention   Session Not Performed   (RN reports pt is discharging home today, no therapy needs)

## 2023-06-12 NOTE — PLAN OF CARE
Goal Outcome Evaluation:                      No new concerns overnight. IVF infusing as ordered. Wife at bedside and call light in reach. Able to make needs known.

## 2023-06-12 NOTE — CASE MANAGEMENT/SOCIAL WORK
Discharge Planning Assessment  [facility]     Patient Name: Danial Wild  MRN: [id number]  Today's Date: 6/12/2023    Admit Date: 6/11/2023    Plan: Home with spouse.   Discharge Needs Assessment       Row Name 06/12/23 1636       Living Environment    People in Home spouse    Current Living Arrangements home    Potentially Unsafe Housing Conditions none    Primary Care Provided by self    Provides Primary Care For no one    Family Caregiver if Needed spouse    Quality of Family Relationships involved;helpful;supportive    Able to Return to Prior Arrangements yes       Resource/Environmental Concerns    Resource/Environmental Concerns none    Transportation Concerns none       Transition Planning    Patient/Family Anticipates Transition to home with family    Patient/Family Anticipated Services at Transition none    Transportation Anticipated family or friend will provide       Discharge Needs Assessment    Readmission Within the Last 30 Days no previous admission in last 30 days    Equipment Currently Used at Home none    Concerns to be Addressed no discharge needs identified;denies needs/concerns at this time    Anticipated Changes Related to Illness none    Equipment Needed After Discharge none                   Discharge Plan       Row Name 06/12/23 1636       Plan    Plan Home with spouse.    Patient/Family in Agreement with Plan yes    Plan Comments Patient lives at home with spouse. Patient does not drive, will need transport at discharge. Patient performs ADL. Pharmacy confirmed. Denies PCP, declined CM to schedule PCP appointment. Denies financial assistance needs for medication and/or food. Denies HH and/or rehab needs. CM asked [facility] to schedule transport through [facility], please reference [facility] note.                  Demographic Summary       Row Name 06/12/23 1636       General Information    Admission Type observation    Arrived From emergency department    Referral Source admission list    Reason  for Consult discharge planning    Preferred Language English                   Functional Status       Row Name 06/12/23 1636       Functional Status    Usual Activity Tolerance good    Current Activity Tolerance good       Functional Status, IADL    Medications independent    Meal Preparation independent    Housekeeping independent    Laundry independent    Shopping independent             Met with patient in room.    Maintained distance greater than six feet and spent less than 15 minutes in the room.    Jessica Solomon RN, BSN  Obs[facility]3C[facility]   [address] [address]  Phone: [phone]  Fax: [phone]

## 2023-06-13 LAB — BACTERIA SPEC AEROBE CULT: NORMAL

## 2023-07-11 LAB — QT INTERVAL: 437 MS

## 2023-11-05 ENCOUNTER — HOSPITAL ENCOUNTER (OUTPATIENT)
Facility: HOSPITAL | Age: 53
Setting detail: OBSERVATION
Discharge: HOME | End: 2023-11-07
Attending: EMERGENCY MEDICINE | Admitting: EMERGENCY MEDICINE
Payer: MEDICAID

## 2023-11-05 ENCOUNTER — APPOINTMENT (OUTPATIENT)
Dept: GENERAL RADIOLOGY | Facility: HOSPITAL | Age: 53
End: 2023-11-05
Payer: MEDICAID

## 2023-11-05 DIAGNOSIS — I25.118 CORONARY ARTERY DISEASE OF NATIVE ARTERY OF NATIVE HEART WITH STABLE ANGINA PECTORIS: ICD-10-CM

## 2023-11-05 DIAGNOSIS — R07.9 CHEST PAIN, UNSPECIFIED TYPE: Primary | ICD-10-CM

## 2023-11-05 LAB
AMPHET+METHAMPHET UR QL: POSITIVE
ANION GAP SERPL CALCULATED.3IONS-SCNC: 9 MMOL/L (ref 5–15)
APTT PPP: 34.8 SECONDS (ref 61–76.5)
BARBITURATES UR QL SCN: NEGATIVE
BASOPHILS # BLD AUTO: 0.1 10*3/MM3 (ref 0–0.2)
BASOPHILS NFR BLD AUTO: 1.4 % (ref 0–1.5)
BENZODIAZ UR QL SCN: NEGATIVE
BUN SERPL-MCNC: 16 MG/DL (ref 6–20)
BUN/CREAT SERPL: 21.9 (ref 7–25)
CALCIUM SPEC-SCNC: 9.4 MG/DL (ref 8.6–10.5)
CANNABINOIDS SERPL QL: NEGATIVE
CHLORIDE SERPL-SCNC: 104 MMOL/L (ref 98–107)
CO2 SERPL-SCNC: 25 MMOL/L (ref 22–29)
COCAINE UR QL: NEGATIVE
CREAT SERPL-MCNC: 0.73 MG/DL (ref 0.76–1.27)
DEPRECATED RDW RBC AUTO: 48.1 FL (ref 37–54)
EGFRCR SERPLBLD CKD-EPI 2021: 108.1 ML/MIN/1.73
EOSINOPHIL # BLD AUTO: 0.8 10*3/MM3 (ref 0–0.4)
EOSINOPHIL NFR BLD AUTO: 9.1 % (ref 0.3–6.2)
ERYTHROCYTE [DISTWIDTH] IN BLOOD BY AUTOMATED COUNT: 15.7 % (ref 12.3–15.4)
GLUCOSE SERPL-MCNC: 125 MG/DL (ref 65–99)
HCT VFR BLD AUTO: 38.2 % (ref 37.5–51)
HGB BLD-MCNC: 12.4 G/DL (ref 13–17.7)
INR PPP: 1.1 (ref 0.93–1.1)
LYMPHOCYTES # BLD AUTO: 2.4 10*3/MM3 (ref 0.7–3.1)
LYMPHOCYTES NFR BLD AUTO: 25.3 % (ref 19.6–45.3)
MCH RBC QN AUTO: 29 PG (ref 26.6–33)
MCHC RBC AUTO-ENTMCNC: 32.5 G/DL (ref 31.5–35.7)
MCV RBC AUTO: 89.4 FL (ref 79–97)
METHADONE UR QL SCN: NEGATIVE
MONOCYTES # BLD AUTO: 0.4 10*3/MM3 (ref 0.1–0.9)
MONOCYTES NFR BLD AUTO: 4.7 % (ref 5–12)
NEUTROPHILS NFR BLD AUTO: 5.6 10*3/MM3 (ref 1.7–7)
NEUTROPHILS NFR BLD AUTO: 59.5 % (ref 42.7–76)
NRBC BLD AUTO-RTO: 0 /100 WBC (ref 0–0.2)
NT-PROBNP SERPL-MCNC: 180.4 PG/ML (ref 0–900)
OPIATES UR QL: NEGATIVE
OXYCODONE UR QL SCN: NEGATIVE
PLATELET # BLD AUTO: 367 10*3/MM3 (ref 140–450)
PMV BLD AUTO: 6.3 FL (ref 6–12)
POTASSIUM SERPL-SCNC: 3.9 MMOL/L (ref 3.5–5.2)
PROTHROMBIN TIME: 11.9 SECONDS (ref 9.6–11.7)
RBC # BLD AUTO: 4.27 10*6/MM3 (ref 4.14–5.8)
SODIUM SERPL-SCNC: 138 MMOL/L (ref 136–145)
TROPONIN T SERPL HS-MCNC: <6 NG/L
WBC NRBC COR # BLD: 9.4 10*3/MM3 (ref 3.4–10.8)

## 2023-11-05 PROCEDURE — 80307 DRUG TEST PRSMV CHEM ANLYZR: CPT | Performed by: EMERGENCY MEDICINE

## 2023-11-05 PROCEDURE — 96374 THER/PROPH/DIAG INJ IV PUSH: CPT

## 2023-11-05 PROCEDURE — 83880 ASSAY OF NATRIURETIC PEPTIDE: CPT | Performed by: EMERGENCY MEDICINE

## 2023-11-05 PROCEDURE — 96375 TX/PRO/DX INJ NEW DRUG ADDON: CPT

## 2023-11-05 PROCEDURE — 36415 COLL VENOUS BLD VENIPUNCTURE: CPT

## 2023-11-05 PROCEDURE — G0378 HOSPITAL OBSERVATION PER HR: HCPCS

## 2023-11-05 PROCEDURE — 93005 ELECTROCARDIOGRAM TRACING: CPT

## 2023-11-05 PROCEDURE — 93005 ELECTROCARDIOGRAM TRACING: CPT | Performed by: EMERGENCY MEDICINE

## 2023-11-05 PROCEDURE — 84484 ASSAY OF TROPONIN QUANT: CPT | Performed by: EMERGENCY MEDICINE

## 2023-11-05 PROCEDURE — 85025 COMPLETE CBC W/AUTO DIFF WBC: CPT | Performed by: EMERGENCY MEDICINE

## 2023-11-05 PROCEDURE — 99284 EMERGENCY DEPT VISIT MOD MDM: CPT

## 2023-11-05 PROCEDURE — 25010000002 MORPHINE PER 10 MG: Performed by: EMERGENCY MEDICINE

## 2023-11-05 PROCEDURE — 85610 PROTHROMBIN TIME: CPT | Performed by: EMERGENCY MEDICINE

## 2023-11-05 PROCEDURE — 80048 BASIC METABOLIC PNL TOTAL CA: CPT | Performed by: EMERGENCY MEDICINE

## 2023-11-05 PROCEDURE — 25010000002 ONDANSETRON PER 1 MG: Performed by: EMERGENCY MEDICINE

## 2023-11-05 PROCEDURE — 96376 TX/PRO/DX INJ SAME DRUG ADON: CPT

## 2023-11-05 PROCEDURE — 71045 X-RAY EXAM CHEST 1 VIEW: CPT

## 2023-11-05 PROCEDURE — 85730 THROMBOPLASTIN TIME PARTIAL: CPT | Performed by: EMERGENCY MEDICINE

## 2023-11-05 RX ORDER — ACETAMINOPHEN 325 MG/1
650 TABLET ORAL EVERY 4 HOURS PRN
Status: DISCONTINUED | OUTPATIENT
Start: 2023-11-05 | End: 2023-11-07 | Stop reason: HOSPADM

## 2023-11-05 RX ORDER — BISACODYL 5 MG/1
5 TABLET, DELAYED RELEASE ORAL DAILY PRN
Status: DISCONTINUED | OUTPATIENT
Start: 2023-11-05 | End: 2023-11-07 | Stop reason: HOSPADM

## 2023-11-05 RX ORDER — NITROGLYCERIN 0.4 MG/1
0.4 TABLET SUBLINGUAL
Status: DISCONTINUED | OUTPATIENT
Start: 2023-11-05 | End: 2023-11-05 | Stop reason: SDUPTHER

## 2023-11-05 RX ORDER — BISACODYL 10 MG
10 SUPPOSITORY, RECTAL RECTAL DAILY PRN
Status: DISCONTINUED | OUTPATIENT
Start: 2023-11-05 | End: 2023-11-07 | Stop reason: HOSPADM

## 2023-11-05 RX ORDER — SODIUM CHLORIDE 0.9 % (FLUSH) 0.9 %
10 SYRINGE (ML) INJECTION AS NEEDED
Status: DISCONTINUED | OUTPATIENT
Start: 2023-11-05 | End: 2023-11-07 | Stop reason: HOSPADM

## 2023-11-05 RX ORDER — ONDANSETRON 2 MG/ML
4 INJECTION INTRAMUSCULAR; INTRAVENOUS EVERY 6 HOURS PRN
Status: DISCONTINUED | OUTPATIENT
Start: 2023-11-05 | End: 2023-11-07 | Stop reason: HOSPADM

## 2023-11-05 RX ORDER — POLYETHYLENE GLYCOL 3350 17 G/17G
17 POWDER, FOR SOLUTION ORAL DAILY PRN
Status: DISCONTINUED | OUTPATIENT
Start: 2023-11-05 | End: 2023-11-07 | Stop reason: HOSPADM

## 2023-11-05 RX ORDER — NITROGLYCERIN 0.4 MG/1
0.4 TABLET SUBLINGUAL
Status: DISCONTINUED | OUTPATIENT
Start: 2023-11-05 | End: 2023-11-07 | Stop reason: HOSPADM

## 2023-11-05 RX ORDER — CHOLECALCIFEROL (VITAMIN D3) 125 MCG
5 CAPSULE ORAL NIGHTLY PRN
Status: DISCONTINUED | OUTPATIENT
Start: 2023-11-05 | End: 2023-11-07 | Stop reason: HOSPADM

## 2023-11-05 RX ORDER — NALOXONE HCL 0.4 MG/ML
0.4 VIAL (ML) INJECTION
Status: DISCONTINUED | OUTPATIENT
Start: 2023-11-05 | End: 2023-11-07 | Stop reason: HOSPADM

## 2023-11-05 RX ORDER — AMOXICILLIN 250 MG
2 CAPSULE ORAL 2 TIMES DAILY
Status: DISCONTINUED | OUTPATIENT
Start: 2023-11-05 | End: 2023-11-07 | Stop reason: HOSPADM

## 2023-11-05 RX ORDER — ONDANSETRON 2 MG/ML
4 INJECTION INTRAMUSCULAR; INTRAVENOUS ONCE
Status: COMPLETED | OUTPATIENT
Start: 2023-11-05 | End: 2023-11-05

## 2023-11-05 RX ORDER — MORPHINE SULFATE 2 MG/ML
1 INJECTION, SOLUTION INTRAMUSCULAR; INTRAVENOUS EVERY 4 HOURS PRN
Status: DISCONTINUED | OUTPATIENT
Start: 2023-11-05 | End: 2023-11-07 | Stop reason: HOSPADM

## 2023-11-05 RX ADMIN — ONDANSETRON 4 MG: 2 INJECTION INTRAMUSCULAR; INTRAVENOUS at 21:59

## 2023-11-05 RX ADMIN — MORPHINE SULFATE 1 MG: 2 INJECTION, SOLUTION INTRAMUSCULAR; INTRAVENOUS at 21:42

## 2023-11-05 RX ADMIN — ONDANSETRON 4 MG: 2 INJECTION INTRAMUSCULAR; INTRAVENOUS at 17:35

## 2023-11-05 NOTE — ED PROVIDER NOTES
"Subjective   History of Present Illness  Chief complaint: Chest pain     53-year-old male presents with chest pain.  Patient does have a history of coronary artery disease with stent placement many years ago.  He states he has not had any cardiac work-up recently.  He states he no longer follows up with a cardiologist.  He states he developed 2 different episodes of chest pain last night.  Pain was in the left chest with radiation to the left arm.  He had associated shortness of breath.  He also had some nausea and vomiting.  He states symptoms resolved and then today he has just had some mild achiness in the left chest and left arm.  He states there is nothing like it was last night.  He denies any alleviating or exacerbating factors.    History provided by:  Patient      Review of Systems   Constitutional:  Negative for fever.   HENT:  Negative for congestion.    Respiratory:  Positive for shortness of breath. Negative for cough.    Cardiovascular:  Positive for chest pain.   Gastrointestinal:  Positive for nausea and vomiting. Negative for abdominal pain.   Musculoskeletal:  Negative for back pain.   Neurological:  Negative for headaches.       Past Medical History:   Diagnosis Date    Coronary artery disease        No Known Allergies    Past Surgical History:   Procedure Laterality Date    CORONARY ANGIOPLASTY WITH STENT PLACEMENT         No family history on file.    Social History     Socioeconomic History    Marital status: Legally    Tobacco Use    Smoking status: Every Day     Packs/day: .5     Types: Cigarettes   Vaping Use    Vaping Use: Every day   Substance and Sexual Activity    Alcohol use: Yes     Comment: occasional    Drug use: Yes     Types: Marijuana     Comment: occasional    Sexual activity: Defer       /69   Pulse 66   Temp 97.9 °F (36.6 °C) (Oral)   Resp 20   Ht 177.8 cm (70\")   Wt 86.9 kg (191 lb 9.3 oz)   SpO2 97%   BMI 27.49 kg/m²       Objective   Physical " Exam  Vitals and nursing note reviewed.   Constitutional:       Appearance: He is well-developed.   HENT:      Head: Normocephalic and atraumatic.   Cardiovascular:      Rate and Rhythm: Normal rate and regular rhythm.      Heart sounds: Normal heart sounds.   Pulmonary:      Effort: Pulmonary effort is normal. No respiratory distress.      Breath sounds: Normal breath sounds.   Abdominal:      General: Bowel sounds are normal.      Palpations: Abdomen is soft.      Tenderness: There is no abdominal tenderness.   Musculoskeletal:      Right lower leg: No tenderness. No edema.      Left lower leg: No tenderness. No edema.   Skin:     General: Skin is warm and dry.   Neurological:      Mental Status: He is alert and oriented to person, place, and time.         Procedures           ED Course      My interpretation of EKG shows sinus rhythm, rate of 83, no ST elevation     Results for orders placed or performed during the hospital encounter of 11/05/23   Basic Metabolic Panel    Specimen: Blood   Result Value Ref Range    Glucose 125 (H) 65 - 99 mg/dL    BUN 16 6 - 20 mg/dL    Creatinine 0.73 (L) 0.76 - 1.27 mg/dL    Sodium 138 136 - 145 mmol/L    Potassium 3.9 3.5 - 5.2 mmol/L    Chloride 104 98 - 107 mmol/L    CO2 25.0 22.0 - 29.0 mmol/L    Calcium 9.4 8.6 - 10.5 mg/dL    BUN/Creatinine Ratio 21.9 7.0 - 25.0    Anion Gap 9.0 5.0 - 15.0 mmol/L    eGFR 108.1 >60.0 mL/min/1.73   Protime-INR    Specimen: Blood   Result Value Ref Range    Protime 11.9 (H) 9.6 - 11.7 Seconds    INR 1.10 0.93 - 1.10   aPTT    Specimen: Blood   Result Value Ref Range    PTT 34.8 (L) 61.0 - 76.5 seconds   Single High Sensitivity Troponin T    Specimen: Blood   Result Value Ref Range    HS Troponin T <6 <15 ng/L   BNP    Specimen: Blood   Result Value Ref Range    proBNP 180.4 0.0 - 900.0 pg/mL   CBC Auto Differential    Specimen: Blood   Result Value Ref Range    WBC 9.40 3.40 - 10.80 10*3/mm3    RBC 4.27 4.14 - 5.80 10*6/mm3    Hemoglobin  12.4 (L) 13.0 - 17.7 g/dL    Hematocrit 38.2 37.5 - 51.0 %    MCV 89.4 79.0 - 97.0 fL    MCH 29.0 26.6 - 33.0 pg    MCHC 32.5 31.5 - 35.7 g/dL    RDW 15.7 (H) 12.3 - 15.4 %    RDW-SD 48.1 37.0 - 54.0 fl    MPV 6.3 6.0 - 12.0 fL    Platelets 367 140 - 450 10*3/mm3    Neutrophil % 59.5 42.7 - 76.0 %    Lymphocyte % 25.3 19.6 - 45.3 %    Monocyte % 4.7 (L) 5.0 - 12.0 %    Eosinophil % 9.1 (H) 0.3 - 6.2 %    Basophil % 1.4 0.0 - 1.5 %    Neutrophils, Absolute 5.60 1.70 - 7.00 10*3/mm3    Lymphocytes, Absolute 2.40 0.70 - 3.10 10*3/mm3    Monocytes, Absolute 0.40 0.10 - 0.90 10*3/mm3    Eosinophils, Absolute 0.80 (H) 0.00 - 0.40 10*3/mm3    Basophils, Absolute 0.10 0.00 - 0.20 10*3/mm3    nRBC 0.0 0.0 - 0.2 /100 WBC   Urine Drug Screen - Urine, Clean Catch    Specimen: Urine, Clean Catch   Result Value Ref Range    Amphet/Methamphet, Screen Positive (A) Negative    Barbiturates Screen, Urine Negative Negative    Benzodiazepine Screen, Urine Negative Negative    Cocaine Screen, Urine Negative Negative    Opiate Screen Negative Negative    THC, Screen, Urine Negative Negative    Methadone Screen, Urine Negative Negative    Oxycodone Screen, Urine Negative Negative   ECG 12 Lead Chest Pain   Result Value Ref Range    QT Interval 386 ms    QTC Interval 455 ms     XR Chest 1 View    Result Date: 11/5/2023  Impression: Hazy groundglass densities. I cannot exclude low-grade pulmonary edema. Electronically Signed: Eliud Kimbrough MD  11/5/2023 5:59 PM EST  Workstation ID: [id number]              HEART Score: 3                      Medical Decision Making  Amount and/or Complexity of Data Reviewed  Labs: ordered.  Radiology: ordered.  ECG/medicine tests: ordered.    Risk  Prescription drug management.      Patient had the above evaluation.  Results were discussed with the patient.  Chest x-ray showed questionable low-grade pulmonary edema.  BNP is normal.  Patient is having no shortness of breath.  Oxygen saturations are in the upper  90s on room air.  EKG shows no acute ischemia.  Troponin is negative.  White blood cell count is normal.  BMP is unremarkable.  Patient does admit to methamphetamine use about a week ago.  His drug screen is positive.  He does have a history of coronary artery disease.  He has not had a work-up in quite some time.  He will be placed in observation for further cardiac monitoring, serial troponins, stress test in the morning.  Patient is agreeable with this plan.      Final diagnoses:   Chest pain, unspecified type       ED Disposition  ED Disposition       ED Disposition   Decision to Admit    Condition   --    Comment   --               No follow-up provider specified.       Medication List      No changes were made to your prescriptions during this visit.            Elvin Caceres MD  11/05/23 1930

## 2023-11-06 ENCOUNTER — APPOINTMENT (OUTPATIENT)
Dept: NUCLEAR MEDICINE | Facility: HOSPITAL | Age: 53
End: 2023-11-06
Payer: MEDICAID

## 2023-11-06 ENCOUNTER — APPOINTMENT (OUTPATIENT)
Dept: CARDIOLOGY | Facility: HOSPITAL | Age: 53
End: 2023-11-06
Payer: MEDICAID

## 2023-11-06 PROBLEM — I25.118 CORONARY ARTERY DISEASE OF NATIVE ARTERY OF NATIVE HEART WITH STABLE ANGINA PECTORIS: Status: ACTIVE | Noted: 2023-11-06

## 2023-11-06 PROBLEM — I10 ESSENTIAL HYPERTENSION: Status: ACTIVE | Noted: 2023-11-06

## 2023-11-06 LAB
ALBUMIN SERPL-MCNC: 3.3 G/DL (ref 3.5–5.2)
ALP SERPL-CCNC: 78 U/L (ref 39–117)
ALT SERPL W P-5'-P-CCNC: 7 U/L (ref 1–41)
ANION GAP SERPL CALCULATED.3IONS-SCNC: 8 MMOL/L (ref 5–15)
AST SERPL-CCNC: 9 U/L (ref 1–40)
BASOPHILS # BLD AUTO: 0.1 10*3/MM3 (ref 0–0.2)
BASOPHILS NFR BLD AUTO: 1.3 % (ref 0–1.5)
BH CV ECHO MEAS - AI P1/2T: 466.7 MSEC
BH CV ECHO MEAS - AO MAX PG: 12.8 MMHG
BH CV ECHO MEAS - AO MEAN PG: 6 MMHG
BH CV ECHO MEAS - AO V2 MAX: 179 CM/SEC
BH CV ECHO MEAS - AO V2 VTI: 30.7 CM
BH CV ECHO MEAS - AVA(I,D): 2.02 CM2
BH CV ECHO MEAS - EDV(CUBED): 46.7 ML
BH CV ECHO MEAS - EDV(MOD-SP4): 144 ML
BH CV ECHO MEAS - EF(MOD-SP4): 55.8 %
BH CV ECHO MEAS - ESV(CUBED): 22 ML
BH CV ECHO MEAS - ESV(MOD-SP4): 63.7 ML
BH CV ECHO MEAS - FS: 22.2 %
BH CV ECHO MEAS - IVS/LVPW: 1.3 CM
BH CV ECHO MEAS - IVSD: 1.3 CM
BH CV ECHO MEAS - LA DIMENSION: 3.2 CM
BH CV ECHO MEAS - LAT PEAK E' VEL: 9 CM/SEC
BH CV ECHO MEAS - LV DIASTOLIC VOL/BSA (35-75): 72.7 CM2
BH CV ECHO MEAS - LV MASS(C)D: 132.7 GRAMS
BH CV ECHO MEAS - LV MAX PG: 5.5 MMHG
BH CV ECHO MEAS - LV MEAN PG: 3 MMHG
BH CV ECHO MEAS - LV SYSTOLIC VOL/BSA (12-30): 32.1 CM2
BH CV ECHO MEAS - LV V1 MAX: 117 CM/SEC
BH CV ECHO MEAS - LV V1 VTI: 21.9 CM
BH CV ECHO MEAS - LVIDD: 3.6 CM
BH CV ECHO MEAS - LVIDS: 2.8 CM
BH CV ECHO MEAS - LVOT AREA: 2.8 CM2
BH CV ECHO MEAS - LVOT DIAM: 1.9 CM
BH CV ECHO MEAS - LVPWD: 1 CM
BH CV ECHO MEAS - MED PEAK E' VEL: 7.6 CM/SEC
BH CV ECHO MEAS - MV A MAX VEL: 89.6 CM/SEC
BH CV ECHO MEAS - MV DEC SLOPE: 206 CM/SEC2
BH CV ECHO MEAS - MV DEC TIME: 0.22 SEC
BH CV ECHO MEAS - MV E MAX VEL: 51.9 CM/SEC
BH CV ECHO MEAS - MV E/A: 0.58
BH CV ECHO MEAS - MV MAX PG: 3.2 MMHG
BH CV ECHO MEAS - MV MEAN PG: 2 MMHG
BH CV ECHO MEAS - MV P1/2T: 102.5 MSEC
BH CV ECHO MEAS - MV V2 VTI: 25.8 CM
BH CV ECHO MEAS - MVA(P1/2T): 2.15 CM2
BH CV ECHO MEAS - MVA(VTI): 2.41 CM2
BH CV ECHO MEAS - PA V2 MAX: 96.5 CM/SEC
BH CV ECHO MEAS - PULM A REVS DUR: 0.12 SEC
BH CV ECHO MEAS - PULM A REVS VEL: 26 CM/SEC
BH CV ECHO MEAS - PULM DIAS VEL: 47 CM/SEC
BH CV ECHO MEAS - PULM S/D: 1.13
BH CV ECHO MEAS - PULM SYS VEL: 52.9 CM/SEC
BH CV ECHO MEAS - RV MAX PG: 3.1 MMHG
BH CV ECHO MEAS - RV V1 MAX: 88.6 CM/SEC
BH CV ECHO MEAS - RV V1 VTI: 16.5 CM
BH CV ECHO MEAS - RVDD: 3.3 CM
BH CV ECHO MEAS - SI(MOD-SP4): 40.5 ML/M2
BH CV ECHO MEAS - SV(LVOT): 62.1 ML
BH CV ECHO MEAS - SV(MOD-SP4): 80.3 ML
BH CV ECHO MEAS - TAPSE (>1.6): 2.29 CM
BH CV ECHO MEASUREMENTS AVERAGE E/E' RATIO: 6.25
BH CV REST NUCLEAR ISOTOPE DOSE: 10.7 MCI
BH CV STRESS BP STAGE 1: NORMAL
BH CV STRESS BP STAGE 2: NORMAL
BH CV STRESS COMMENTS STAGE 1: NORMAL
BH CV STRESS COMMENTS STAGE 2: NORMAL
BH CV STRESS DOSE REGADENOSON STAGE 1: 0.4
BH CV STRESS DURATION MIN STAGE 1: 0
BH CV STRESS DURATION MIN STAGE 2: 4
BH CV STRESS DURATION SEC STAGE 1: 10
BH CV STRESS DURATION SEC STAGE 2: 0
BH CV STRESS HR STAGE 1: 81
BH CV STRESS HR STAGE 2: 102
BH CV STRESS NUCLEAR ISOTOPE DOSE: 30.1 MCI
BH CV STRESS PROTOCOL 1: NORMAL
BH CV STRESS RECOVERY BP: NORMAL MMHG
BH CV STRESS RECOVERY HR: 87 BPM
BH CV STRESS STAGE 1: 1
BH CV STRESS STAGE 2: 2
BH CV XLRA - RV BASE: 4.2 CM
BH CV XLRA - RV LENGTH: 8 CM
BH CV XLRA - RV MID: 3.7 CM
BH CV XLRA - TDI S': 15.6 CM/SEC
BILIRUB CONJ SERPL-MCNC: <0.2 MG/DL (ref 0–0.3)
BILIRUB INDIRECT SERPL-MCNC: ABNORMAL MG/DL
BILIRUB SERPL-MCNC: 0.4 MG/DL (ref 0–1.2)
BUN SERPL-MCNC: 11 MG/DL (ref 6–20)
BUN/CREAT SERPL: 14.7 (ref 7–25)
CALCIUM SPEC-SCNC: 9 MG/DL (ref 8.6–10.5)
CHLORIDE SERPL-SCNC: 101 MMOL/L (ref 98–107)
CHOLEST SERPL-MCNC: 127 MG/DL (ref 0–200)
CO2 SERPL-SCNC: 25 MMOL/L (ref 22–29)
CREAT SERPL-MCNC: 0.75 MG/DL (ref 0.76–1.27)
DEPRECATED RDW RBC AUTO: 47.7 FL (ref 37–54)
EGFRCR SERPLBLD CKD-EPI 2021: 107.2 ML/MIN/1.73
EOSINOPHIL # BLD AUTO: 0.7 10*3/MM3 (ref 0–0.4)
EOSINOPHIL NFR BLD AUTO: 8.7 % (ref 0.3–6.2)
ERYTHROCYTE [DISTWIDTH] IN BLOOD BY AUTOMATED COUNT: 15.8 % (ref 12.3–15.4)
GLUCOSE SERPL-MCNC: 104 MG/DL (ref 65–99)
HBA1C MFR BLD: 5.6 % (ref 4.8–5.6)
HCT VFR BLD AUTO: 38.5 % (ref 37.5–51)
HDLC SERPL-MCNC: 33 MG/DL (ref 40–60)
HGB BLD-MCNC: 12.6 G/DL (ref 13–17.7)
LDLC SERPL CALC-MCNC: 78 MG/DL (ref 0–100)
LDLC/HDLC SERPL: 2.36 {RATIO}
LEFT ATRIUM VOLUME INDEX: 26.8 ML/M2
LYMPHOCYTES # BLD AUTO: 2.8 10*3/MM3 (ref 0.7–3.1)
LYMPHOCYTES NFR BLD AUTO: 32.8 % (ref 19.6–45.3)
MAGNESIUM SERPL-MCNC: 1.9 MG/DL (ref 1.6–2.6)
MAXIMAL PREDICTED HEART RATE: 166 BPM
MCH RBC QN AUTO: 28.9 PG (ref 26.6–33)
MCHC RBC AUTO-ENTMCNC: 32.8 G/DL (ref 31.5–35.7)
MCV RBC AUTO: 88.1 FL (ref 79–97)
MONOCYTES # BLD AUTO: 0.5 10*3/MM3 (ref 0.1–0.9)
MONOCYTES NFR BLD AUTO: 5.3 % (ref 5–12)
NEUTROPHILS NFR BLD AUTO: 4.5 10*3/MM3 (ref 1.7–7)
NEUTROPHILS NFR BLD AUTO: 51.9 % (ref 42.7–76)
NRBC BLD AUTO-RTO: 0 /100 WBC (ref 0–0.2)
PERCENT MAX PREDICTED HR: 68.67 %
PLATELET # BLD AUTO: 345 10*3/MM3 (ref 140–450)
PMV BLD AUTO: 6.1 FL (ref 6–12)
POTASSIUM SERPL-SCNC: 3.8 MMOL/L (ref 3.5–5.2)
PROT SERPL-MCNC: 7 G/DL (ref 6–8.5)
QT INTERVAL: 386 MS
QTC INTERVAL: 455 MS
RBC # BLD AUTO: 4.37 10*6/MM3 (ref 4.14–5.8)
SINUS: 3.5 CM
SODIUM SERPL-SCNC: 134 MMOL/L (ref 136–145)
STRESS BASELINE BP: NORMAL MMHG
STRESS BASELINE HR: 81 BPM
STRESS PERCENT HR: 81 %
STRESS POST PEAK BP: NORMAL MMHG
STRESS POST PEAK HR: 114 BPM
STRESS TARGET HR: 141 BPM
T4 FREE SERPL-MCNC: 1.13 NG/DL (ref 0.93–1.7)
TRIGL SERPL-MCNC: 80 MG/DL (ref 0–150)
TROPONIN T SERPL HS-MCNC: 7 NG/L
TSH SERPL DL<=0.05 MIU/L-ACNC: 2.05 UIU/ML (ref 0.27–4.2)
VLDLC SERPL-MCNC: 16 MG/DL (ref 5–40)
WBC NRBC COR # BLD: 8.6 10*3/MM3 (ref 3.4–10.8)

## 2023-11-06 PROCEDURE — 78452 HT MUSCLE IMAGE SPECT MULT: CPT

## 2023-11-06 PROCEDURE — 84443 ASSAY THYROID STIM HORMONE: CPT | Performed by: NURSE PRACTITIONER

## 2023-11-06 PROCEDURE — 93306 TTE W/DOPPLER COMPLETE: CPT

## 2023-11-06 PROCEDURE — G0378 HOSPITAL OBSERVATION PER HR: HCPCS

## 2023-11-06 PROCEDURE — 85025 COMPLETE CBC W/AUTO DIFF WBC: CPT | Performed by: EMERGENCY MEDICINE

## 2023-11-06 PROCEDURE — 84439 ASSAY OF FREE THYROXINE: CPT | Performed by: NURSE PRACTITIONER

## 2023-11-06 PROCEDURE — A9502 TC99M TETROFOSMIN: HCPCS | Performed by: EMERGENCY MEDICINE

## 2023-11-06 PROCEDURE — 78452 HT MUSCLE IMAGE SPECT MULT: CPT | Performed by: INTERNAL MEDICINE

## 2023-11-06 PROCEDURE — 99204 OFFICE O/P NEW MOD 45 MIN: CPT | Performed by: INTERNAL MEDICINE

## 2023-11-06 PROCEDURE — 80076 HEPATIC FUNCTION PANEL: CPT | Performed by: NURSE PRACTITIONER

## 2023-11-06 PROCEDURE — 93306 TTE W/DOPPLER COMPLETE: CPT | Performed by: INTERNAL MEDICINE

## 2023-11-06 PROCEDURE — 83036 HEMOGLOBIN GLYCOSYLATED A1C: CPT | Performed by: NURSE PRACTITIONER

## 2023-11-06 PROCEDURE — 80048 BASIC METABOLIC PNL TOTAL CA: CPT | Performed by: EMERGENCY MEDICINE

## 2023-11-06 PROCEDURE — 25010000002 REGADENOSON 0.4 MG/5ML SOLUTION: Performed by: EMERGENCY MEDICINE

## 2023-11-06 PROCEDURE — 80061 LIPID PANEL: CPT | Performed by: NURSE PRACTITIONER

## 2023-11-06 PROCEDURE — 83735 ASSAY OF MAGNESIUM: CPT | Performed by: NURSE PRACTITIONER

## 2023-11-06 PROCEDURE — 93018 CV STRESS TEST I&R ONLY: CPT | Performed by: INTERNAL MEDICINE

## 2023-11-06 PROCEDURE — 93017 CV STRESS TEST TRACING ONLY: CPT

## 2023-11-06 PROCEDURE — 0 TECHNETIUM TETROFOSMIN KIT: Performed by: EMERGENCY MEDICINE

## 2023-11-06 PROCEDURE — 84484 ASSAY OF TROPONIN QUANT: CPT | Performed by: EMERGENCY MEDICINE

## 2023-11-06 RX ORDER — ASPIRIN 81 MG/1
81 TABLET ORAL DAILY
Status: DISCONTINUED | OUTPATIENT
Start: 2023-11-06 | End: 2023-11-07 | Stop reason: HOSPADM

## 2023-11-06 RX ORDER — REGADENOSON 0.08 MG/ML
0.4 INJECTION, SOLUTION INTRAVENOUS
Status: COMPLETED | OUTPATIENT
Start: 2023-11-06 | End: 2023-11-06

## 2023-11-06 RX ORDER — ATORVASTATIN CALCIUM 10 MG/1
10 TABLET, FILM COATED ORAL NIGHTLY
Status: DISCONTINUED | OUTPATIENT
Start: 2023-11-06 | End: 2023-11-07 | Stop reason: HOSPADM

## 2023-11-06 RX ORDER — METOPROLOL SUCCINATE 25 MG/1
25 TABLET, EXTENDED RELEASE ORAL
Status: DISCONTINUED | OUTPATIENT
Start: 2023-11-06 | End: 2023-11-07 | Stop reason: HOSPADM

## 2023-11-06 RX ADMIN — ATORVASTATIN CALCIUM 10 MG: 10 TABLET, FILM COATED ORAL at 21:23

## 2023-11-06 RX ADMIN — METOPROLOL SUCCINATE 25 MG: 25 TABLET, EXTENDED RELEASE ORAL at 16:39

## 2023-11-06 RX ADMIN — ASPIRIN 81 MG: 81 TABLET, COATED ORAL at 16:38

## 2023-11-06 RX ADMIN — REGADENOSON 0.4 MG: 0.08 INJECTION, SOLUTION INTRAVENOUS at 08:02

## 2023-11-06 RX ADMIN — DOCUSATE SODIUM 50MG AND SENNOSIDES 8.6MG 2 TABLET: 8.6; 5 TABLET, FILM COATED ORAL at 21:23

## 2023-11-06 RX ADMIN — TETROFOSMIN 1 DOSE: 1.38 INJECTION, POWDER, LYOPHILIZED, FOR SOLUTION INTRAVENOUS at 06:55

## 2023-11-06 RX ADMIN — TETROFOSMIN 1 DOSE: 1.38 INJECTION, POWDER, LYOPHILIZED, FOR SOLUTION INTRAVENOUS at 08:02

## 2023-11-06 NOTE — ED NOTES
Nursing report ED to floor  Danial Wild  53 y.o.  male    HPI:   Chief Complaint   Patient presents with    Chest Pain     Left sided chest pain, achy, vomiting, left arm pain and numbness.   Pt recent meth use last week and marijuana use.           Admitting doctor:   Elvin Caceres MD    Admitting diagnosis:   The encounter diagnosis was Chest pain, unspecified type.    Code status:   Current Code Status       Date Active Code Status Order ID Comments User Context       Prior            Allergies:   Patient has no known allergies.    Isolation:  No active isolations     Fall Risk:  Fall Risk Assessment was completed, and patient is at low risk for falls.   Predictive Model Details         6 (Low) Factor Value    Calculated 11/5/2023 20:12 Age 53    Risk of Fall Model Musculoskeletal Assessment WDL     Active Peripheral IV Present     Imaging order in this encounter Present     Respiratory Rate 20     Skin Assessment WDL     Magnesium not on file     Tobacco Use Current     Diastolic BP 71     Anton Scale not on file     Peripheral Vascular Assessment WDL     Cardiac Assessment X     Financial Class Medicaid     Clinically Relevant Sex Not Female     Creatinine 0.73 mg/dL     Gastrointestinal Assessment WDL     Number of Distinct Medication Classes administered 1     Albumin not on file     Calcium 9.4 mg/dL     Total Bilirubin not on file     Days after Admission 0.136     Chloride 104 mmol/L     Potassium 3.9 mmol/L     ALT not on file         Weight:       11/05/23  1648   Weight: 86.9 kg (191 lb 9.3 oz)       Intake and Output  No intake or output data in the 24 hours ending 11/05/23 2012    Diet:   Dietary Orders (From admission, onward)       Start     Ordered    11/06/23 0001  NPO Diet NPO Type: Strict NPO  Diet Effective Midnight        Question:  NPO Type  Answer:  Strict NPO    11/05/23 1933    11/06/23 0001  NPO Diet NPO Type: Sips with Meds  (Procedure Panel)  Diet Effective Midnight       "  Question:  NPO Type  Answer:  Sips with Meds    11/05/23 1933                     Most recent vitals:   Vitals:    11/05/23 1648 11/05/23 1846 11/05/23 1946 11/05/23 2001   BP:  139/69 124/69 125/71   BP Location:       Patient Position:       Pulse:  66 77 76   Resp: 20      Temp:       TempSrc:       SpO2:  97% 96% 97%   Weight: 86.9 kg (191 lb 9.3 oz)      Height: 177.8 cm (70\")          Active LDAs/IV Access:   Lines, Drains & Airways       Active LDAs       Name Placement date Placement time Site Days    Peripheral IV 11/05/23 1730 Anterior;Left Forearm 11/05/23 1730  Forearm  less than 1                    Skin Condition:   Skin Assessments (last day)       None             Labs (abnormal labs have a star):   Labs Reviewed   BASIC METABOLIC PANEL - Abnormal; Notable for the following components:       Result Value    Glucose 125 (*)     Creatinine 0.73 (*)     All other components within normal limits    Narrative:     GFR Normal >60  Chronic Kidney Disease <60  Kidney Failure <15     PROTIME-INR - Abnormal; Notable for the following components:    Protime 11.9 (*)     All other components within normal limits   APTT - Abnormal; Notable for the following components:    PTT 34.8 (*)     All other components within normal limits   CBC WITH AUTO DIFFERENTIAL - Abnormal; Notable for the following components:    Hemoglobin 12.4 (*)     RDW 15.7 (*)     Monocyte % 4.7 (*)     Eosinophil % 9.1 (*)     Eosinophils, Absolute 0.80 (*)     All other components within normal limits   URINE DRUG SCREEN - Abnormal; Notable for the following components:    Amphet/Methamphet, Screen Positive (*)     All other components within normal limits    Narrative:     Negative Thresholds Per Drugs Screened:    Amphetamines                 500 ng/ml  Barbiturates                 200 ng/ml  Benzodiazepines              100 ng/ml  Cocaine                      300 ng/ml  Methadone                    300 ng/ml  Opiates                    "   300 ng/ml  Oxycodone                    100 ng/ml  THC                           50 ng/ml    The Normal Value for all drugs tested is negative. This report includes final unconfirmed screening results to be used for medical treatment purposes only. Unconfirmed results must not be used for non-medical purposes such as employment or legal testing. Clinical consideration should be applied to any drug of abuse test, particularly when unconfirmed results are used.          All urine drugs of abuse requests without chain of custody are for medical screening purposes only.  False positives are possible.     SINGLE HSTROPONIN T - Normal    Narrative:     High Sensitive Troponin T Reference Range:  <10.0 ng/L- Negative Female for AMI  <15.0 ng/L- Negative Male for AMI  >=10 - Abnormal Female indicating possible myocardial injury.  >=15 - Abnormal Male indicating possible myocardial injury.   Clinicians would have to utilize clinical acumen, EKG, Troponin, and serial changes to determine if it is an Acute Myocardial Infarction or myocardial injury due to an underlying chronic condition.        BNP (IN-HOUSE) - Normal    Narrative:     This assay is used as an aid in the diagnosis of individuals suspected of having heart failure. It can be used as an aid in the diagnosis of acute decompensated heart failure (ADHF) in patients presenting with signs and symptoms of ADHF to the emergency department (ED). In addition, NT-proBNP of <300 pg/mL indicates ADHF is not likely.    Age Range Result Interpretation  NT-proBNP Concentration (pg/mL:      <50             Positive            >450                   Gray                 300-450                    Negative             <300    50-75           Positive            >900                  Gray                300-900                  Negative            <300      >75             Positive            >1800                  Gray                300-1800                  Negative             <300   CBC AND DIFFERENTIAL    Narrative:     The following orders were created for panel order CBC & Differential.  Procedure                               Abnormality         Status                     ---------                               -----------         ------                     CBC Auto Differential[[id number]]        Abnormal            Final result                 Please view results for these tests on the individual orders.       LOC: Person, Place, Time, and Situation    Telemetry:  Observation Unit    Cardiac Monitoring Ordered: yes    EKG:   ECG 12 Lead Chest Pain   Preliminary Result   HEART RATE= 83  bpm   RR Interval= 720  ms   AR Interval= 152  ms   P Horizontal Axis= -8  deg   P Front Axis= 63  deg   QRSD Interval= 105  ms   QT Interval= 386  ms   QTcB= 455  ms   QRS Axis= 37  deg   T Wave Axis= 50  deg   - ABNORMAL ECG -   Sinus rhythm   Probable left atrial enlargement   Anterolateral infarct, age indeterminate   Electronically Signed By:    Date and Time of Study: 2023-11-05 16:59:56          Medications Given in the ED:   Medications   sodium chloride 0.9 % flush 10 mL (has no administration in time range)   acetaminophen (TYLENOL) tablet 650 mg (has no administration in time range)   sennosides-docusate (PERICOLACE) 8.6-50 MG per tablet 2 tablet (has no administration in time range)     And   polyethylene glycol (MIRALAX) packet 17 g (has no administration in time range)     And   bisacodyl (DULCOLAX) EC tablet 5 mg (has no administration in time range)     And   bisacodyl (DULCOLAX) suppository 10 mg (has no administration in time range)   ondansetron (ZOFRAN) injection 4 mg (has no administration in time range)   melatonin tablet 5 mg (has no administration in time range)   nitroglycerin (NITROSTAT) SL tablet 0.4 mg (has no administration in time range)   morphine injection 1 mg (has no administration in time range)     And   naloxone (NARCAN) injection 0.4 mg (has no administration in  time range)   ondansetron (ZOFRAN) injection 4 mg (4 mg Intravenous Given 11/5/23 7092)       Imaging results:  XR Chest 1 View    Result Date: 11/5/2023  Impression: Hazy groundglass densities. I cannot exclude low-grade pulmonary edema. Electronically Signed: Eliud Kimbrough MD  11/5/2023 5:59 PM EST  Workstation ID: [id number]     Social issues:   Social History     Socioeconomic History    Marital status: Legally    Tobacco Use    Smoking status: Every Day     Packs/day: .5     Types: Cigarettes   Vaping Use    Vaping Use: Every day   Substance and Sexual Activity    Alcohol use: Yes     Comment: occasional    Drug use: Yes     Types: Marijuana     Comment: occasional    Sexual activity: Defer       NIH Stroke Scale:  Interval: (not recorded)  1a. Level of Consciousness: (not recorded)  1b. LOC Questions: (not recorded)  1c. LOC Commands: (not recorded)  2. Best Gaze: (not recorded)  3. Visual: (not recorded)  4. Facial Palsy: (not recorded)  5a. Motor Arm, Left: (not recorded)  5b. Motor Arm, Right: (not recorded)  6a. Motor Leg, Left: (not recorded)  6b. Motor Leg, Right: (not recorded)  7. Limb Ataxia: (not recorded)  8. Sensory: (not recorded)  9. Best Language: (not recorded)  10. Dysarthria: (not recorded)  11. Extinction and Inattention (formerly Neglect): (not recorded)    Total (NIH Stroke Scale): (not recorded)     Additional notable assessment information:     Nursing report ED to floor:  diego Reid RN   11/05/23 20:12 EST

## 2023-11-06 NOTE — SIGNIFICANT NOTE
11/06/23 4822   Living Situation   Current Living Arrangements other (see comments)  (Pt living with a friend. Pt is homeless.)   Potentially Unsafe Housing Conditions none   Food Insecurity   Within the past 12 months, you worried that your food would run out before you got the money to buy more. Never true   Within the past 12 months, the food you bought just didn't last and you didn't have money to get more. Never true   Transportation Needs   In the past 12 months, has lack of transportation kept you from medical appointments or from getting medications? no  (Pt reports that he has friends/family to transport him.)   In the past 12 months, has lack of transportation kept you from meetings, work, or from getting things needed for daily living? No   Utilities   In the past 12 months has the electric, gas, oil, or water company threatened to shut off services in your home? No   Abuse Screen (yes response referral indicated)   Feels Unsafe at Home or Work/School no   Feels Threatened by Someone no   Does Anyone Try to Keep You From Having Contact with Others or Doing Things Outside Your Home? no   Physical Signs of Abuse Present no   Financial Resource Strain   How hard is it for you to pay for the very basics like food, housing, medical care, and heating? Somewhat  (Pt is workign on Disability.)   Employment   Do you want help finding or keeping work or a job? I do not need or want help   Family and Community Support   If for any reason you need help with day-to-day activities such as bathing, preparing meals, shopping, managing finances, etc., do you get the help you need? I don't need any help   How often do you feel lonely or isolated from those around you? Sometimes   Education   Preferred Language English   Do you want help with school or training? For example, starting or completing job training or getting a high school diploma, GED or equivalent No   Physical Activity   On average, how many days per week  do you engage in moderate to strenuous exercise (like a brisk walk)? 0 days   On average, how many minutes do you engage in exercise at this level? 0 min   Number of minutes of exercise per week (!) 0   Alcohol Use   Q1: How often do you have a drink containing alcohol? Monthly or l   Q2: How many drinks containing alcohol do you have on a typical day when you are drinking? 1 or 2   Q3: How often do you have six or more drinks on one occasion? Never  (Pt reports using Spice and marijuana socially.)   Disabilities   Concentrating, Remembering or Making Decisions Difficulty yes   Concentration Management Pt reports that he has trouble with concentration.   Doing Errands Independently Difficulty (such as shopping) yes   Errands Management Pt is able to complete ADL's independently.

## 2023-11-06 NOTE — CASE MANAGEMENT/SOCIAL WORK
Discharge Planning Assessment  [facility]     Patient Name: Danial Wild  MRN: [id number]  Today's Date: 11/6/2023    Admit Date: 11/5/2023    Plan: Home w/ roommate.   Discharge Needs Assessment       Row Name 11/06/23 1224       Living Environment    People in Home friend(s)    Name(s) of People in Home friend Rick    Current Living Arrangements home    Potentially Unsafe Housing Conditions none    Primary Care Provided by self    Provides Primary Care For no one    Family Caregiver if Needed child(chavez), adult    Family Caregiver Names alexis Wall    Quality of Family Relationships helpful;involved;supportive    Able to Return to Prior Arrangements yes       Resource/Environmental Concerns    Transportation Concerns none       Transition Planning    Patient/Family Anticipates Transition to home    Patient/Family Anticipated Services at Transition none    Transportation Anticipated family or friend will provide       Discharge Needs Assessment    Equipment Currently Used at Home none    Concerns to be Addressed other (see comments)  SW to see patient about applying for disability    Provided Post Acute Provider List? N/A    Provided Post Acute Provider Quality & Resource List? N/A                   Discharge Plan       Row Name 11/06/23 1228       Plan    Plan Home w/ roommate.    Plan Comments CM met with patient at the bedside. Confirmed insurance and pharmacy. Patient does not have PCP, CM and SW discussed PCP, and SW will set patient up with [facility]. Patient denies any difficulty affording medications. Patient is not current with any HHC/OPPT/OT services. Patient lives with his friend Rick, is IADLS, and does not drive. Patient's daughter Mae or friend Rick will provide transportation for patient at discharge. DC Barriers:cardiology consult, pending cardiac workup.                   Demographic Summary       Row Name 11/06/23 1221       General Information    Admission Type observation    Arrived  From emergency department    Referral Source admission list    Reason for Consult care coordination/care conference;discharge planning    Preferred Language English       Contact Information    Permission Granted to Share Info With     Contact Information Obtained for                    Functional Status       Row Name 11/06/23 1223       Functional Status    Usual Activity Tolerance good    Current Activity Tolerance good       Functional Status, IADL    Medications independent    Meal Preparation independent    Housekeeping independent    Laundry independent    Shopping independent                Michael Beavers RN      Cell number [phone]  Office number [phone]

## 2023-11-06 NOTE — H&P
[facility] Observation Unit H&P    Patient Name: Danial Wild  : 1970  MRN: [id number]  Primary Care Physician: Provider, No Known  Date of admission: 2023     Patient Care Team:  Provider, No Known as PCP - General  Provider, No Known as PCP - Family Medicine          Subjective   History Present Illness     Chief Complaint:   Chief Complaint   Patient presents with    Chest Pain     Left sided chest pain, achy, vomiting, left arm pain and numbness.   Pt recent meth use last week and marijuana use.         Chest Pain     Mr. Wild is a 53 y.o.  presents to [facility] complaining of chest pain      History of Present Illness    ED 23: 53-year-old male presents with chest pain.  Patient does have a history of coronary artery disease with stent placement many years ago.  He states he has not had any cardiac work-up recently.  He states he no longer follows up with a cardiologist.  He states he developed 2 different episodes of chest pain last night.  Pain was in the left chest with radiation to the left arm.  He had associated shortness of breath.  He also had some nausea and vomiting.  He states symptoms resolved and then today he has just had some mild achiness in the left chest and left arm.  He states there is nothing like it was last night.  He denies any alleviating or exacerbating factors.     Observation 23: Patient is a 53-year-old male presented to the hospital with complaints of chest pain.  Patient dates his chest pain is midsternal that radiated to back.  Patient states he does have history of coronary disease with stent placement many years ago.  Patient also reports shortness of breath, nausea and weakness.    Review of Systems   Constitutional: Positive for malaise/fatigue.   HENT: Negative.     Eyes: Negative.    Cardiovascular:  Positive for chest pain and dyspnea on exertion.   Respiratory:  Positive for shortness of breath.    Endocrine: Negative.     Hematologic/Lymphatic: Negative.    Skin: Negative.    Musculoskeletal: Negative.    Gastrointestinal:  Positive for nausea and vomiting.   Genitourinary: Negative.    Neurological:  Positive for weakness.   Psychiatric/Behavioral:  Positive for substance abuse. The patient is nervous/anxious.    Allergic/Immunologic: Negative.            Personal History     Past Medical History:   Past Medical History:   Diagnosis Date    Coronary artery disease        Surgical History:      Past Surgical History:   Procedure Laterality Date    CORONARY ANGIOPLASTY WITH STENT PLACEMENT             Family History: family history is not on file. Otherwise pertinent FHx was reviewed and unremarkable.     Social History:  reports that he has been smoking cigarettes. He has been smoking an average of .5 packs per day. He does not have any smokeless tobacco history on file. He reports current alcohol use. He reports current drug use. Drug: Marijuana.      Medications:  Prior to Admission medications    Medication Sig Start Date End Date Taking? Authorizing Provider   ibuprofen (ADVIL,MOTRIN) 400 MG tablet Take 1 tablet by mouth Every 6 (Six) Hours As Needed for Mild Pain.   Yes Provider, MD Michael   ondansetron (ZOFRAN) 4 MG tablet Take 1 tablet by mouth Every 6 (Six) Hours As Needed for Nausea or Vomiting. 6/12/23  Yes Tiffany Cho APRN   traMADol (ULTRAM) 50 MG tablet Take 1 tablet by mouth Every 6 (Six) Hours As Needed for Moderate Pain. 6/12/23  Yes Tiffany Cho APRN       Allergies:  No Known Allergies    Objective   Objective     Vital Signs  Temp:  [97.9 °F (36.6 °C)-98.9 °F (37.2 °C)] 98 °F (36.7 °C)  Heart Rate:  [66-93] 75  Resp:  [18-20] 18  BP: (107-145)/() 131/92  SpO2:  [96 %-99 %] 98 %  on   ;   Device (Oxygen Therapy): room air  Body mass index is 25.46 kg/m².    Physical Exam  Vitals and nursing note reviewed.   Constitutional:       Appearance: Normal appearance.   HENT:      Head: Normocephalic and  atraumatic.   Cardiovascular:      Rate and Rhythm: Normal rate and regular rhythm.   Musculoskeletal:      Cervical back: Normal range of motion.   Neurological:      Mental Status: He is alert.           Results Review:  I have personally reviewed most recent cardiac tracings, lab results, microbiology results, and radiology images and interpretations and agree with findings, most notably: CBC, CMP, troponin, chest x-ray, EKG.    Results from last 7 days   Lab Units 11/06/23  0442 11/05/23  1731   WBC 10*3/mm3 8.60 9.40   HEMOGLOBIN g/dL 12.6* 12.4*   HEMATOCRIT % 38.5 38.2   PLATELETS 10*3/mm3 345 367   INR   --  1.10     Results from last 7 days   Lab Units 11/06/23  0442 11/05/23  1731   SODIUM mmol/L 134* 138   POTASSIUM mmol/L 3.8 3.9   CHLORIDE mmol/L 101 104   CO2 mmol/L 25.0 25.0   BUN mg/dL 11 16   CREATININE mg/dL 0.75* 0.73*   GLUCOSE mg/dL 104* 125*   CALCIUM mg/dL 9.0 9.4   ALK PHOS U/L 78  --    ALT (SGPT) U/L 7  --    AST (SGOT) U/L 9  --    HSTROP T ng/L 7 <6   PROBNP pg/mL  --  180.4     Estimated Creatinine Clearance: 128.2 mL/min (A) (by C-G formula based on SCr of 0.75 mg/dL (L)).  Brief Urine Lab Results  (Last result in the past 365 days)        Color   Clarity   Blood   Leuk Est   Nitrite   Protein   CREAT   Urine HCG        06/11/23 1640 Dark Yellow   Clear   Small (1+)   Negative   Negative   30 mg/dL (1+)                   Microbiology Results (last 10 days)       ** No results found for the last 240 hours. **            ECG/EMG Results (most recent)       Procedure Component Value Units Date/Time    ECG 12 Lead Chest Pain [[id number]] Collected: 11/05/23 1659     Updated: 11/06/23 0636     QT Interval 386 ms      QTC Interval 455 ms     Narrative:      HEART RATE= 83  bpm  RR Interval= 720  ms  HI Interval= 152  ms  P Horizontal Axis= -8  deg  P Front Axis= 63  deg  QRSD Interval= 105  ms  QT Interval= 386  ms  QTcB= 455  ms  QRS Axis= 37  deg  T Wave Axis= 50  deg  - ABNORMAL ECG  -  Sinus rhythm  Probable left atrial enlargement  Anterolateral infarct, age indeterminate  When compared with ECG of 12-Jun-2023 8:20:41,  No significant change  Electronically Signed By: Elvin Caceres (Derrek) 06-Nov-2023 06:35:59  Date and Time of Study: 2023-11-05 16:59:56                    XR Chest 1 View    Result Date: 11/5/2023  Impression: Hazy groundglass densities. I cannot exclude low-grade pulmonary edema. Electronically Signed: Eliud Kimbrough MD  11/5/2023 5:59 PM EST  Workstation ID: [id number]       Estimated Creatinine Clearance: 128.2 mL/min (A) (by C-G formula based on SCr of 0.75 mg/dL (L)).    Assessment & Plan   Assessment/Plan       Active Hospital Problems    Diagnosis  POA    **Chest pain [R07.9]  Unknown      Resolved Hospital Problems   No resolved problems to display.     Chest pain  Lab Results   Component Value Date    TROPONINT 7 11/06/2023    TROPONINT <6 11/05/2023    TROPONINT <6 02/08/2023     -  -Chest X-ray: Hazy groundglass densities with possible pulmonary edema  -EKG: Sinus rhythm probable left atrial enlargement desiccant signage from previous EKG  -Stress Test ordered low risk study suggestive of inferior infarct with mild ischemia with a EF of 54%  -Echocardiogram pending  -Telemetry  -Cardiology consulted     Substance abuse  -UDS positive for amphetamines  -Social work consulted      VTE Prophylaxis -   Mechanical Order History:        Ordered        11/05/23 1933  Place Sequential Compression Device  Once            11/05/23 1933  Maintain Sequential Compression Device  Continuous                          Pharmalogical Order History:       None            CODE STATUS:    Code Status and Medical Interventions:   Ordered at: 11/06/23 1238     Level Of Support Discussed With:    Patient     Code Status (Patient has no pulse and is not breathing):    CPR (Attempt to Resuscitate)     Medical Interventions (Patient has pulse or is breathing):    Full Support       This patient  has been examined wearing personal protective equipment.     I discussed the patient's findings and my recommendations with patient, family, nursing staff, primary care team, and consulting provider.      Signature:Electronically signed by JAYLIN Flores, 11/06/23, 2:23 PM EST.      I spent 35 minutes caring for Danial on this date of service. This time includes time spent by me in the following activities: reviewing tests, obtaining and/or reviewing a separately obtained history, performing a medically appropriate examination and/or evaluation, counseling and educating the patient/family/caregiver, ordering medications, tests, or procedures, referring and communicating with other health care professionals, documenting information in the medical record, independently interpreting results and communicating that information with the patient/family/caregiver, and care coordination.

## 2023-11-06 NOTE — PLAN OF CARE
Goal Outcome Evaluation:  Plan of Care Reviewed With: patient           Outcome Evaluation: Pt admitted for chest pain. Pt plans to have stress test this morning. Pt was sleeping well and denied any complaints at this time. Vitals stable. Will continue to monitor patient.

## 2023-11-06 NOTE — CASE MANAGEMENT/SOCIAL WORK
Social Work Assessment  [facility]     Patient Name: Danial Wild  MRN: [id number]  Today's Date: 11/6/2023    Admit Date: 11/5/2023       Discharge Plan       Row Name 11/06/23 1626       Plan    Plan Comments SW met with Pt to complete SDOH. Pt reports that he is staying with a friend but does not have his own place to live. Pt reports that he is connected with [facility] for [facility]. Pt reported that he uses Spice and marijuana socially. Pt and SW discussed fentanyl test kits. Pt reports that he gets them free at a local park every Saturday. Pt reported to SW that he recently went to [facility] for mental health treatment and they are still prescribing medications to him. Pt stated he was assigned a PCP but has never seen her. Pt was agreeable to SW setting up PCP appointment as well as discussing follow up for mental health services. SW called [facility] in [place] and scheduled an appointment for Monday the 13th at 1:30 PM to follow up with PCP outpatient and to discuss mental health medications. SW provided Pt with a list of therapists for ongoing counseling. Pt reports that he is on Cymbalta and Trazadone for his sleep and that he is diagnosed with depression and anxiety and one other thing but couldn't recall what the third diagnoses is. Pt reported that he had a SI attempt about 6 years ago. Pt reported that sometimes he has SI thoughts often about not being here but did not and does not have a plan of action. Pt stated he relies on his daughters for support and that he has a new grandbaby that provide him with something to look forward to. He reports that his oldest daughter lives in [place] but provides him with a lot of emotional support as well as friends. Pt reports no feelings or thoughts of SI at this visit. SW put PCP follow up and transportation information on Pt AVS. Pt thanked SW for listening. No further needs at this time.             PAL Vergara,  FLAKITA    Phone: [phone]  Fax: [phone]  Email: [email or web address]

## 2023-11-06 NOTE — PLAN OF CARE
Goal Outcome Evaluation:              Outcome Evaluation: pt arrives to obs unit for dx of chest pain; pt arrives with increased pain #8 requesting pain meds and something to eat; pt also c/o nausea so kept pt npo and morphine given for cp with immediate relief

## 2023-11-06 NOTE — DISCHARGE INSTR - OTHER ORDERS
Outpatient PCP: [facility] [address] [place] [id number]. Outpatient follow up appointment Monday the 13th at 1:30 pm. Please bring Insurance and photo ID.     Transportation. Call 48 hours before appointment: [employer] ([facility] [employer]) [phone] or you can call your insurance for medical transportation at     [facility] transportation: [phone] Give 48 hour notice for transportation appointments.

## 2023-11-06 NOTE — CONSULTS
"  Referring Provider: Elvin Caceres MD    Reason for Consultation: Chest pain      Patient Care Team:  Provider, No Known as PCP - General  Provider, No Known as PCP - Family Medicine      SUBJECTIVE     Chief Complaint:  chest pain     History of present illness:  Danial Wild is a 53 y.o. male with a history of CAD s/p PCI, hypertension, hyperlipidemia, illicit drug user who presented to [facility] with complaint of chest pain on 11/5/2023.  Patient states he had 2 episodes of chest pain while sleeping.  He describes pain as midsternal, sharp, with radiation to the left chest and neck.  He experienced associated symptoms of shortness of breath.  He denies edema, palpitations, nausea, vomiting, diaphoresis.    Cardiology consulted for chest pain.  Troponin 7 , proBNP 180.4, CXR with hazy groundglass densities, UDS positive for methamphetamines.  EKG shows sinus rhythm with no acute ST-T changes.  Patient states he does have history of CAD and previously had two stents placed and one artery that was unsuitable for PCI.  Patient is unsure of which arteries stents were placed, year, and cardiologist who did intervention.  Patient does not follow with cardiology as he states \" his previous cardiologist made him mad.\"  He does not take any cardiac medications but states he was previously on Plavix for chronic CAD.  No recent cardiac work-up available.  Echocardiogram reviewed from 2019 which showed normal LVEF with no significant valvular abnormalities noted.      Review of systems:    Constitutional: No weakness, fatigue, fever, rigors, chills   Eyes: No vision changes, eye pain   ENT/oropharynx: No difficulty swallowing, sore throat, epistaxis, changes in hearing   Cardiovascular: + chest pain, No chest tightness, palpitations, paroxysmal nocturnal dyspnea, orthopnea, diaphoresis, dizziness / syncopal episode   Respiratory: + shortness of breath, No dyspnea on exertion, cough, wheezing, hemoptysis "   Gastrointestinal: No abdominal pain, nausea, vomiting, diarrhea, bloody stools   Genitourinary: No hematuria, dysuria   Neurological: No headache, tremors, numbness, one-sided weakness    Musculoskeletal: No cramps, myalgias, joint pain, joint swelling   Integument: No rash, edema        Personal History:      Past Medical History:   Diagnosis Date    Coronary artery disease        Past Surgical History:   Procedure Laterality Date    CORONARY ANGIOPLASTY WITH STENT PLACEMENT         History reviewed. No pertinent family history.    Social History     Tobacco Use    Smoking status: Every Day     Packs/day: .5     Types: Cigarettes   Vaping Use    Vaping Use: Every day   Substance Use Topics    Alcohol use: Yes     Comment: occasional    Drug use: Yes     Types: Marijuana     Comment: occasional        Home meds:  Prior to Admission medications    Medication Sig Start Date End Date Taking? Authorizing Provider   ibuprofen (ADVIL,MOTRIN) 400 MG tablet Take 1 tablet by mouth Every 6 (Six) Hours As Needed for Mild Pain.   Yes Provider, MD Michael   ondansetron (ZOFRAN) 4 MG tablet Take 1 tablet by mouth Every 6 (Six) Hours As Needed for Nausea or Vomiting. 6/12/23  Yes Tiffany Cho APRN   traMADol (ULTRAM) 50 MG tablet Take 1 tablet by mouth Every 6 (Six) Hours As Needed for Moderate Pain. 6/12/23  Yes Tiffany Cho APRN       Allergies:     Patient has no known allergies.    Scheduled Meds:aspirin, 81 mg, Oral, Daily  atorvastatin, 10 mg, Oral, Nightly  metoprolol succinate XL, 25 mg, Oral, Q24H  senna-docusate sodium, 2 tablet, Oral, BID      Continuous Infusions:   PRN Meds:  acetaminophen    senna-docusate sodium **AND** polyethylene glycol **AND** bisacodyl **AND** bisacodyl    influenza vaccine    melatonin    Morphine **AND** naloxone    nitroglycerin    ondansetron    [COMPLETED] Insert Peripheral IV **AND** sodium chloride      OBJECTIVE    Vital Signs  Vitals:    11/06/23 0252 11/06/23 0559 11/06/23  "0930 11/06/23 1502   BP: (!) 124/108 107/80 131/92 146/90   BP Location: Right arm Left arm Right arm Right arm   Patient Position: Lying Lying Lying Lying   Pulse: 79 76 75 83   Resp: 18 18 18 16   Temp: 98.9 °F (37.2 °C) 98.9 °F (37.2 °C) 98 °F (36.7 °C) 98.8 °F (37.1 °C)   TempSrc: Oral Oral Oral Oral   SpO2: 96% 96% 98% 96%   Weight:       Height:           Flowsheet Rows      Flowsheet Row First Filed Value   Admission Height 177.8 cm (70\") Documented at 11/05/2023 1648   Admission Weight 86.9 kg (191 lb 9.3 oz) Documented at 11/05/2023 1648              Intake/Output Summary (Last 24 hours) at 11/6/2023 1555  Last data filed at 11/6/2023 1300  Gross per 24 hour   Intake 600 ml   Output --   Net 600 ml        Telemetry:  sinus rhythm     Physical Exam:  The patient is alert, oriented and in no distress.  Vital signs as noted above.  Head and neck revealed no carotid bruits or jugular venous distention.  No thyromegaly or lymphadenopathy is present  Lungs clear.  No wheezing.  Breath sounds are normal bilaterally.  Heart: Normal first and second heart sounds. No murmur.  No precordial rub is present.  No gallop is present.  Abdomen: Soft and nontender.  No organomegaly is present.  Extremities with good peripheral pulses without any pedal edema.  Skin: Warm and dry.  Musculoskeletal system is grossly normal.  CNS grossly normal.     Reviewed and updated.    Results Review:  I have personally reviewed the results from the time of this admission to 11/6/2023 15:55 EST and agree with these findings:  [x]  Laboratory  []  Microbiology  [x]  Radiology  [x]  EKG/Telemetry   [x]  Cardiology/Vascular   []  Pathology  []  Old records  []  Other:    Most notable findings include:     Lab Results (last 24 hours)       Procedure Component Value Units Date/Time    TSH [[id number]]  (Normal) Collected: 11/06/23 0442    Specimen: Blood from Arm, Right Updated: 11/06/23 1020     TSH 2.050 uIU/mL     T4, Free [[id number]]  " (Normal) Collected: 11/06/23 0442    Specimen: Blood from Arm, Right Updated: 11/06/23 1020     Free T4 1.13 ng/dL     Narrative:      Results may be falsely increased if patient taking Biotin.      Lipid Panel [[id number]]  (Abnormal) Collected: 11/06/23 0442    Specimen: Blood from Arm, Right Updated: 11/06/23 1006     Total Cholesterol 127 mg/dL      Triglycerides 80 mg/dL      HDL Cholesterol 33 mg/dL      LDL Cholesterol  78 mg/dL      VLDL Cholesterol 16 mg/dL      LDL/HDL Ratio 2.36    Narrative:      Cholesterol Reference Ranges  (U.S. Department of Health and Human Services ATP III Classifications)    Desirable          <200 mg/dL  Borderline High    200-239 mg/dL  High Risk          >240 mg/dL      Triglyceride Reference Ranges  (U.S. Department of Health and Human Services ATP III Classifications)    Normal           <150 mg/dL  Borderline High  150-199 mg/dL  High             200-499 mg/dL  Very High        >500 mg/dL    HDL Reference Ranges  (U.S. Department of Health and Human Services ATP III Classifications)    Low     <40 mg/dl (major risk factor for CHD)  High    >60 mg/dl ('negative' risk factor for CHD)        LDL Reference Ranges  (U.S. Department of Health and Human Services ATP III Classifications)    Optimal          <100 mg/dL  Near Optimal     100-129 mg/dL  Borderline High  130-159 mg/dL  High             160-189 mg/dL  Very High        >189 mg/dL    Hepatic Function Panel [[id number]]  (Abnormal) Collected: 11/06/23 0442    Specimen: Blood from Arm, Right Updated: 11/06/23 1006     Total Protein 7.0 g/dL      Albumin 3.3 g/dL      ALT (SGPT) 7 U/L      AST (SGOT) 9 U/L      Alkaline Phosphatase 78 U/L      Total Bilirubin 0.4 mg/dL      Bilirubin, Direct <0.2 mg/dL      Bilirubin, Indirect --     Comment: Unable to calculate       Magnesium [[id number]]  (Normal) Collected: 11/06/23 0442    Specimen: Blood from Arm, Right Updated: 11/06/23 1006     Magnesium 1.9 mg/dL     Hemoglobin A1c  [[id number]]  (Normal) Collected: 11/06/23 0442    Specimen: Blood from Arm, Right Updated: 11/06/23 0919     Hemoglobin A1C 5.60 %     Basic Metabolic Panel [[id number]]  (Abnormal) Collected: 11/06/23 0442    Specimen: Blood from Arm, Right Updated: 11/06/23 0526     Glucose 104 mg/dL      BUN 11 mg/dL      Creatinine 0.75 mg/dL      Sodium 134 mmol/L      Potassium 3.8 mmol/L      Chloride 101 mmol/L      CO2 25.0 mmol/L      Calcium 9.0 mg/dL      BUN/Creatinine Ratio 14.7     Anion Gap 8.0 mmol/L      eGFR 107.2 mL/min/1.73     Narrative:      GFR Normal >60  Chronic Kidney Disease <60  Kidney Failure <15      High Sensitivity Troponin T [[id number]]  (Normal) Collected: 11/06/23 0442    Specimen: Blood from Arm, Right Updated: 11/06/23 0526     HS Troponin T 7 ng/L     Narrative:      High Sensitive Troponin T Reference Range:  <10.0 ng/L- Negative Female for AMI  <15.0 ng/L- Negative Male for AMI  >=10 - Abnormal Female indicating possible myocardial injury.  >=15 - Abnormal Male indicating possible myocardial injury.   Clinicians would have to utilize clinical acumen, EKG, Troponin, and serial changes to determine if it is an Acute Myocardial Infarction or myocardial injury due to an underlying chronic condition.         CBC & Differential [[id number]]  (Abnormal) Collected: 11/06/23 0442    Specimen: Blood from Arm, Right Updated: 11/06/23 0455    Narrative:      The following orders were created for panel order CBC & Differential.  Procedure                               Abnormality         Status                     ---------                               -----------         ------                     CBC Auto Differential[[id number]]        Abnormal            Final result                 Please view results for these tests on the individual orders.    CBC Auto Differential [[id number]]  (Abnormal) Collected: 11/06/23 0442    Specimen: Blood from Arm, Right Updated: 11/06/23 0455     WBC 8.60 10*3/mm3      RBC  4.37 10*6/mm3      Hemoglobin 12.6 g/dL      Hematocrit 38.5 %      MCV 88.1 fL      MCH 28.9 pg      MCHC 32.8 g/dL      RDW 15.8 %      RDW-SD 47.7 fl      MPV 6.1 fL      Platelets 345 10*3/mm3      Neutrophil % 51.9 %      Lymphocyte % 32.8 %      Monocyte % 5.3 %      Eosinophil % 8.7 %      Basophil % 1.3 %      Neutrophils, Absolute 4.50 10*3/mm3      Lymphocytes, Absolute 2.80 10*3/mm3      Monocytes, Absolute 0.50 10*3/mm3      Eosinophils, Absolute 0.70 10*3/mm3      Basophils, Absolute 0.10 10*3/mm3      nRBC 0.0 /100 WBC     Urine Drug Screen - Urine, Clean Catch [[id number]]  (Abnormal) Collected: 11/05/23 1754    Specimen: Urine, Clean Catch Updated: 11/05/23 1821     Amphet/Methamphet, Screen Positive     Barbiturates Screen, Urine Negative     Benzodiazepine Screen, Urine Negative     Cocaine Screen, Urine Negative     Opiate Screen Negative     THC, Screen, Urine Negative     Methadone Screen, Urine Negative     Oxycodone Screen, Urine Negative    Narrative:      Negative Thresholds Per Drugs Screened:    Amphetamines                 500 ng/ml  Barbiturates                 200 ng/ml  Benzodiazepines              100 ng/ml  Cocaine                      300 ng/ml  Methadone                    300 ng/ml  Opiates                      300 ng/ml  Oxycodone                    100 ng/ml  THC                           50 ng/ml    The Normal Value for all drugs tested is negative. This report includes final unconfirmed screening results to be used for medical treatment purposes only. Unconfirmed results must not be used for non-medical purposes such as employment or legal testing. Clinical consideration should be applied to any drug of abuse test, particularly when unconfirmed results are used.          All urine drugs of abuse requests without chain of custody are for medical screening purposes only.  False positives are possible.      Basic Metabolic Panel [[id number]]  (Abnormal) Collected: 11/05/23 7173     Specimen: Blood Updated: 11/05/23 1800     Glucose 125 mg/dL      BUN 16 mg/dL      Creatinine 0.73 mg/dL      Sodium 138 mmol/L      Potassium 3.9 mmol/L      Chloride 104 mmol/L      CO2 25.0 mmol/L      Calcium 9.4 mg/dL      BUN/Creatinine Ratio 21.9     Anion Gap 9.0 mmol/L      eGFR 108.1 mL/min/1.73     Narrative:      GFR Normal >60  Chronic Kidney Disease <60  Kidney Failure <15      Single High Sensitivity Troponin T [[id number]]  (Normal) Collected: 11/05/23 1731    Specimen: Blood Updated: 11/05/23 1800     HS Troponin T <6 ng/L     Narrative:      High Sensitive Troponin T Reference Range:  <10.0 ng/L- Negative Female for AMI  <15.0 ng/L- Negative Male for AMI  >=10 - Abnormal Female indicating possible myocardial injury.  >=15 - Abnormal Male indicating possible myocardial injury.   Clinicians would have to utilize clinical acumen, EKG, Troponin, and serial changes to determine if it is an Acute Myocardial Infarction or myocardial injury due to an underlying chronic condition.         BNP [[id number]]  (Normal) Collected: 11/05/23 1731    Specimen: Blood Updated: 11/05/23 1800     proBNP 180.4 pg/mL     Narrative:      This assay is used as an aid in the diagnosis of individuals suspected of having heart failure. It can be used as an aid in the diagnosis of acute decompensated heart failure (ADHF) in patients presenting with signs and symptoms of ADHF to the emergency department (ED). In addition, NT-proBNP of <300 pg/mL indicates ADHF is not likely.    Age Range Result Interpretation  NT-proBNP Concentration (pg/mL:      <50             Positive            >450                   Gray                 300-450                    Negative             <300    50-75           Positive            >900                  Gray                300-900                  Negative            <300      >75             Positive            >1800                  Gray                300-1800                  Negative             <300    Protime-INR [[id number]]  (Abnormal) Collected: 11/05/23 1731    Specimen: Blood Updated: 11/05/23 1750     Protime 11.9 Seconds      INR 1.10    aPTT [[id number]]  (Abnormal) Collected: 11/05/23 1731    Specimen: Blood Updated: 11/05/23 1750     PTT 34.8 seconds     CBC & Differential [[id number]]  (Abnormal) Collected: 11/05/23 1731    Specimen: Blood Updated: 11/05/23 1739    Narrative:      The following orders were created for panel order CBC & Differential.  Procedure                               Abnormality         Status                     ---------                               -----------         ------                     CBC Auto Differential[[id number]]        Abnormal            Final result                 Please view results for these tests on the individual orders.    CBC Auto Differential [[id number]]  (Abnormal) Collected: 11/05/23 1731    Specimen: Blood Updated: 11/05/23 1739     WBC 9.40 10*3/mm3      RBC 4.27 10*6/mm3      Hemoglobin 12.4 g/dL      Hematocrit 38.2 %      MCV 89.4 fL      MCH 29.0 pg      MCHC 32.5 g/dL      RDW 15.7 %      RDW-SD 48.1 fl      MPV 6.3 fL      Platelets 367 10*3/mm3      Neutrophil % 59.5 %      Lymphocyte % 25.3 %      Monocyte % 4.7 %      Eosinophil % 9.1 %      Basophil % 1.4 %      Neutrophils, Absolute 5.60 10*3/mm3      Lymphocytes, Absolute 2.40 10*3/mm3      Monocytes, Absolute 0.40 10*3/mm3      Eosinophils, Absolute 0.80 10*3/mm3      Basophils, Absolute 0.10 10*3/mm3      nRBC 0.0 /100 WBC             Imaging Results (Last 24 Hours)       Procedure Component Value Units Date/Time    XR Chest 1 View [[id number]] Collected: 11/05/23 1758     Updated: 11/05/23 1801    Narrative:      XR CHEST 1 VW    Date of Exam: 11/5/2023 5:40 PM EST    Indication: Chest pain    Comparison: 2/8/2023.    Findings:  Heart size is prominent. There is some hazy groundglass density. I cannot completely exclude low-grade pulmonary edema. There is no dense airspace  consolidation, pleural effusion, or pneumothorax. There is mild thoracic spondylosis.      Impression:      Impression:  Hazy groundglass densities. I cannot exclude low-grade pulmonary edema.      Electronically Signed: Eliud Kimbrough MD    11/5/2023 5:59 PM EST    Workstation ID: [id number]            LAB RESULTS (LAST 7 DAYS)    CBC  Results from last 7 days   Lab Units 11/06/23  0442 11/05/23  1731   WBC 10*3/mm3 8.60 9.40   RBC 10*6/mm3 4.37 4.27   HEMOGLOBIN g/dL 12.6* 12.4*   HEMATOCRIT % 38.5 38.2   MCV fL 88.1 89.4   PLATELETS 10*3/mm3 345 367       BMP  Results from last 7 days   Lab Units 11/06/23  0442 11/05/23  1731   SODIUM mmol/L 134* 138   POTASSIUM mmol/L 3.8 3.9   CHLORIDE mmol/L 101 104   CO2 mmol/L 25.0 25.0   BUN mg/dL 11 16   CREATININE mg/dL 0.75* 0.73*   GLUCOSE mg/dL 104* 125*   MAGNESIUM mg/dL 1.9  --        CMP   Results from last 7 days   Lab Units 11/06/23  0442 11/05/23  1731   SODIUM mmol/L 134* 138   POTASSIUM mmol/L 3.8 3.9   CHLORIDE mmol/L 101 104   CO2 mmol/L 25.0 25.0   BUN mg/dL 11 16   CREATININE mg/dL 0.75* 0.73*   GLUCOSE mg/dL 104* 125*   ALBUMIN g/dL 3.3*  --    BILIRUBIN mg/dL 0.4  --    ALK PHOS U/L 78  --    AST (SGOT) U/L 9  --    ALT (SGPT) U/L 7  --        .2         TROPONIN  Results from last 7 days   Lab Units 11/06/23  0442   HSTROP T ng/L 7       CoAg  Results from last 7 days   Lab Units 11/05/23  1731   INR  1.10   APTT seconds 34.8*       Creatinine Clearance  Estimated Creatinine Clearance: 128.2 mL/min (A) (by C-G formula based on SCr of 0.75 mg/dL (L)).    ABG          Radiology  XR Chest 1 View    Result Date: 11/5/2023  Impression: Hazy groundglass densities. I cannot exclude low-grade pulmonary edema. Electronically Signed: Eliud Kimbrough MD  11/5/2023 5:59 PM EST  Workstation ID: [id number]       EKG  I personally viewed and interpreted the patient's EKG/Telemetry data:  ECG 12 Lead Chest Pain   Final Result   HEART RATE= 83  bpm   RR Interval= 720  ms    HI Interval= 152  ms   P Horizontal Axis= -8  deg   P Front Axis= 63  deg   QRSD Interval= 105  ms   QT Interval= 386  ms   QTcB= 455  ms   QRS Axis= 37  deg   T Wave Axis= 50  deg   - ABNORMAL ECG -   Sinus rhythm   Probable left atrial enlargement   Anterolateral infarct, age indeterminate   When compared with ECG of 12-Jun-2023 8:20:41,   No significant change   Electronically Signed By: Elvin Caceres (Derrek) 06-Nov-2023 06:35:59   Date and Time of Study: 2023-11-05 16:59:56            Echocardiogram:          Stress Test:  Results for orders placed during the hospital encounter of 11/05/23    Stress Test With Myocardial Perfusion One Day    Interpretation Summary  Indications  Chest pain    This study was performed under the direct supervision [place] SALINAS.    Resting ECG  Sinus rhythm old inferior infarction changes    Patient exercised for 3.28 minutes using the Sammy protocol.  The maximum heart rate achieved was 114 and maximum systolic blood pressure 113.  Patient did not have any chest discomfort ST-T wave abnormalities or ectopy.    Cardiolite was used as an imaging agent.    Cardiolite images showed mildly decreased radionuclide uptake in the inferior segments with minimal reperfusion suggestive of inferior infarction with mild ischemia.    Gated SPECT images revealed normal left ventricular size and contractility with ejection fraction of 54%.    Impression  ========    Limited exercise tolerance.  Nuclear images showed decreased radionuclide uptake in the proximal inferior segments with minimal reperfusion suggestive of inferior infarction with mild ischemia.  Low risk study for ischemia.  Gated SPECT images revealed normal left ventricular size and contractility with ejection fraction of 54%.        Cardiac Catheterization:  No results found for this or any previous visit.        Other:      ASSESSMENT & PLAN:    Principal Problem:    Chest pain  Active Problems:    Essential hypertension    Coronary  artery disease of native artery of native heart with stable angina pectoris    Chest pain  History of CAD s/p stent  Patient has history of PCI x2, unaware what year or which arteries  Patient has been noncompliant with medical therapy and does not currently follow with a cardiologist   Troponin negative  proBNP 180.4  EKG with evidence of old infarction  Myoview study low risk for ischemia with evidence of prior infarction and mild ischemia  Obtain echocardiogram to evaluate LVEF and valvular abnormalities  Start aspirin, statin, beta-blocker  Recommend Plavix for chronic CAD    Hypertension  Blood pressure elevated  Start beta blocker     Hyperlipidemia  Lipid panel reviewed  Goal LDL <50   Start low dose statin therapy     Illicit drug use  UDS + methamphetamines  Patient states he last did meth 3 days ago  He reports he also does Spice  Recommend  consult    Further cardiac care based on results of echocardiogram    Further recommendations and assessment per Dr. Becca Holder, APRN  11/06/23  15:55 EST    Patient was seen around 5:30 PM today.  Patient is somewhat nondescript about his symptoms.  Chart was reviewed in entirety.  Reviewed and agree with the assessment and plan as documented by Kristine nurse practitioner.  Patient had previous stent placement and patient is unaware of the place and time.  Majority of the MDM was performed by me.    Stress Cardiolite test.-11/6/2023  Limited exercise tolerance.  Nuclear images showed decreased radionuclide uptake in the proximal inferior segments with minimal reperfusion suggestive of inferior infarction with mild ischemia.  Low risk study for ischemia.  Gated SPECT images revealed normal left ventricular size and contractility with ejection fraction of 54%.    Echocardiogram.    Observe closely.  Consider cardiac catheterization if patient has continued symptoms.    Further plan will depend on patient's progress.  [[[[[[[[[[[[[[[

## 2023-11-07 VITALS
BODY MASS INDEX: 25.41 KG/M2 | TEMPERATURE: 98.9 F | OXYGEN SATURATION: 98 % | DIASTOLIC BLOOD PRESSURE: 108 MMHG | HEIGHT: 70 IN | RESPIRATION RATE: 14 BRPM | HEART RATE: 77 BPM | SYSTOLIC BLOOD PRESSURE: 141 MMHG | WEIGHT: 177.47 LBS

## 2023-11-07 LAB
ANION GAP SERPL CALCULATED.3IONS-SCNC: 9 MMOL/L (ref 5–15)
BASOPHILS # BLD AUTO: 0.1 10*3/MM3 (ref 0–0.2)
BASOPHILS NFR BLD AUTO: 1.3 % (ref 0–1.5)
BUN SERPL-MCNC: 13 MG/DL (ref 6–20)
BUN/CREAT SERPL: 16 (ref 7–25)
CALCIUM SPEC-SCNC: 9.7 MG/DL (ref 8.6–10.5)
CHLORIDE SERPL-SCNC: 101 MMOL/L (ref 98–107)
CO2 SERPL-SCNC: 25 MMOL/L (ref 22–29)
CREAT SERPL-MCNC: 0.81 MG/DL (ref 0.76–1.27)
DEPRECATED RDW RBC AUTO: 47.7 FL (ref 37–54)
EGFRCR SERPLBLD CKD-EPI 2021: 104.8 ML/MIN/1.73
EOSINOPHIL # BLD AUTO: 0.7 10*3/MM3 (ref 0–0.4)
EOSINOPHIL NFR BLD AUTO: 7.3 % (ref 0.3–6.2)
ERYTHROCYTE [DISTWIDTH] IN BLOOD BY AUTOMATED COUNT: 15.7 % (ref 12.3–15.4)
GLUCOSE SERPL-MCNC: 100 MG/DL (ref 65–99)
HCT VFR BLD AUTO: 41 % (ref 37.5–51)
HGB BLD-MCNC: 13.5 G/DL (ref 13–17.7)
LYMPHOCYTES # BLD AUTO: 2.8 10*3/MM3 (ref 0.7–3.1)
LYMPHOCYTES NFR BLD AUTO: 28.4 % (ref 19.6–45.3)
MCH RBC QN AUTO: 29 PG (ref 26.6–33)
MCHC RBC AUTO-ENTMCNC: 33 G/DL (ref 31.5–35.7)
MCV RBC AUTO: 88 FL (ref 79–97)
MONOCYTES # BLD AUTO: 0.5 10*3/MM3 (ref 0.1–0.9)
MONOCYTES NFR BLD AUTO: 5.1 % (ref 5–12)
NEUTROPHILS NFR BLD AUTO: 5.7 10*3/MM3 (ref 1.7–7)
NEUTROPHILS NFR BLD AUTO: 57.9 % (ref 42.7–76)
NRBC BLD AUTO-RTO: 0.1 /100 WBC (ref 0–0.2)
PLATELET # BLD AUTO: 395 10*3/MM3 (ref 140–450)
PMV BLD AUTO: 6.5 FL (ref 6–12)
POTASSIUM SERPL-SCNC: 4.2 MMOL/L (ref 3.5–5.2)
RBC # BLD AUTO: 4.66 10*6/MM3 (ref 4.14–5.8)
SODIUM SERPL-SCNC: 135 MMOL/L (ref 136–145)
WBC NRBC COR # BLD: 9.9 10*3/MM3 (ref 3.4–10.8)

## 2023-11-07 PROCEDURE — 99204 OFFICE O/P NEW MOD 45 MIN: CPT | Performed by: INTERNAL MEDICINE

## 2023-11-07 PROCEDURE — 80048 BASIC METABOLIC PNL TOTAL CA: CPT | Performed by: EMERGENCY MEDICINE

## 2023-11-07 PROCEDURE — 85025 COMPLETE CBC W/AUTO DIFF WBC: CPT | Performed by: EMERGENCY MEDICINE

## 2023-11-07 PROCEDURE — G0378 HOSPITAL OBSERVATION PER HR: HCPCS

## 2023-11-07 RX ORDER — ASPIRIN 81 MG/1
81 TABLET ORAL DAILY
Qty: 90 TABLET | Refills: 0 | Status: SHIPPED | OUTPATIENT
Start: 2023-11-08

## 2023-11-07 RX ORDER — METOPROLOL SUCCINATE 25 MG/1
25 TABLET, EXTENDED RELEASE ORAL
Qty: 90 TABLET | Refills: 0 | Status: SHIPPED | OUTPATIENT
Start: 2023-11-08

## 2023-11-07 RX ORDER — ATORVASTATIN CALCIUM 10 MG/1
10 TABLET, FILM COATED ORAL NIGHTLY
Qty: 90 TABLET | Refills: 0 | Status: SHIPPED | OUTPATIENT
Start: 2023-11-07

## 2023-11-07 RX ADMIN — ASPIRIN 81 MG: 81 TABLET, COATED ORAL at 09:32

## 2023-11-07 RX ADMIN — METOPROLOL SUCCINATE 25 MG: 25 TABLET, EXTENDED RELEASE ORAL at 09:32

## 2023-11-07 NOTE — CASE MANAGEMENT/SOCIAL WORK
Continued Stay Note  SCOTT Brooks     Patient Name: Danial Wild  MRN: [id number]  Today's Date: 11/7/2023    Admit Date: 11/5/2023    Plan: Home w/ roommate   Discharge Plan       Row Name 11/07/23 0811       Plan    Plan Home w/ roommate    Plan Comments DC barriers: potential cardiac cath today.                Michael Beavers RN     Cell number [phone]  Office number [phone]

## 2023-11-07 NOTE — PROGRESS NOTES
Referring Provider: Elvin Caceres MD    Reason for follow-up:  Chest pain  Status post stent     Patient Care Team:  Provider, No Known as PCP - General  Provider, No Known as PCP - Family Medicine    Subjective .      ROS    Since I have last seen him yesterday, the patient has been without any chest discomfort ,shortness of breath, palpitations, dizziness or syncope.  Denies having any headache ,abdominal pain ,nausea, vomiting , diarrhea constipation, loss of weight or loss of appetite.  Denies having any excessive bruising ,hematuria or blood in the stool.    Review of all systems negative except as indicated    History  Past Medical History:   Diagnosis Date    Coronary artery disease        Past Surgical History:   Procedure Laterality Date    CORONARY ANGIOPLASTY WITH STENT PLACEMENT         History reviewed. No pertinent family history.    Social History     Tobacco Use    Smoking status: Every Day     Packs/day: .5     Types: Cigarettes   Vaping Use    Vaping Use: Every day   Substance Use Topics    Alcohol use: Yes     Comment: occasional    Drug use: Yes     Types: Marijuana     Comment: occasional        Medications Prior to Admission   Medication Sig Dispense Refill Last Dose    ibuprofen (ADVIL,MOTRIN) 400 MG tablet Take 1 tablet by mouth Every 6 (Six) Hours As Needed for Mild Pain.       ondansetron (ZOFRAN) 4 MG tablet Take 1 tablet by mouth Every 6 (Six) Hours As Needed for Nausea or Vomiting. 30 tablet 0     traMADol (ULTRAM) 50 MG tablet Take 1 tablet by mouth Every 6 (Six) Hours As Needed for Moderate Pain. 10 tablet 0        Allergies  Patient has no known allergies.    Scheduled Meds:aspirin, 81 mg, Oral, Daily  atorvastatin, 10 mg, Oral, Nightly  metoprolol succinate XL, 25 mg, Oral, Q24H  senna-docusate sodium, 2 tablet, Oral, BID      Continuous Infusions:   PRN Meds:.  acetaminophen    senna-docusate sodium **AND** polyethylene glycol **AND** bisacodyl **AND** bisacodyl    influenza  "vaccine    melatonin    Morphine **AND** naloxone    nitroglycerin    ondansetron    [COMPLETED] Insert Peripheral IV **AND** sodium chloride    Objective     VITAL SIGNS  Vitals:    11/06/23 1502 11/06/23 1847 11/07/23 0010 11/07/23 0300   BP: 146/90 (!) 136/114 116/98 134/98   BP Location: Right arm Right arm Right arm Right arm   Patient Position: Lying Lying Lying Lying   Pulse: 83 74     Resp: 16 16 16 14   Temp: 98.8 °F (37.1 °C) 98.9 °F (37.2 °C) 99 °F (37.2 °C) 98 °F (36.7 °C)   TempSrc: Oral Oral Oral Oral   SpO2: 96% 99% 98% 97%   Weight:       Height:           Flowsheet Rows      Flowsheet Row First Filed Value   Admission Height 177.8 cm (70\") Documented at 11/05/2023 1648   Admission Weight 86.9 kg (191 lb 9.3 oz) Documented at 11/05/2023 1648              Intake/Output Summary (Last 24 hours) at 11/7/2023 0620  Last data filed at 11/6/2023 1300  Gross per 24 hour   Intake 600 ml   Output --   Net 600 ml        TELEMETRY: Sinus rhythm    Physical Exam:  The patient is alert, oriented and in no distress.  Vital signs as noted above.  Head and neck revealed no carotid bruits or jugular venous distention.  No thyromegaly or lymphadenopathy is present  Lungs clear.  No wheezing.  Breath sounds are normal bilaterally.  Heart normal first and second heart sounds.  No murmur. No precordial rub is present.  No gallop is present.  Abdomen soft and nontender.  No organomegaly is present.  Extremities with good peripheral pulses without any pedal edema.  Skin warm and dry.  Musculoskeletal system is grossly normal  CNS grossly normal    Reviewed and updated.    Results Review:   I reviewed the patient's new clinical results.  Lab Results (last 24 hours)       Procedure Component Value Units Date/Time    CBC & Differential [[id number]]  (Abnormal) Collected: 11/07/23 0506    Specimen: Blood from Arm, Right Updated: 11/07/23 0589    Narrative:      The following orders were created for panel order CBC & " Differential.  Procedure                               Abnormality         Status                     ---------                               -----------         ------                     CBC Auto Differential[[id number]]        Abnormal            Final result                 Please view results for these tests on the individual orders.    CBC Auto Differential [[id number]]  (Abnormal) Collected: 11/07/23 0506    Specimen: Blood from Arm, Right Updated: 11/07/23 0555     WBC 9.90 10*3/mm3      RBC 4.66 10*6/mm3      Hemoglobin 13.5 g/dL      Hematocrit 41.0 %      MCV 88.0 fL      MCH 29.0 pg      MCHC 33.0 g/dL      RDW 15.7 %      RDW-SD 47.7 fl      MPV 6.5 fL      Platelets 395 10*3/mm3      Neutrophil % 57.9 %      Lymphocyte % 28.4 %      Monocyte % 5.1 %      Eosinophil % 7.3 %      Basophil % 1.3 %      Neutrophils, Absolute 5.70 10*3/mm3      Lymphocytes, Absolute 2.80 10*3/mm3      Monocytes, Absolute 0.50 10*3/mm3      Eosinophils, Absolute 0.70 10*3/mm3      Basophils, Absolute 0.10 10*3/mm3      nRBC 0.1 /100 WBC     Basic Metabolic Panel [[id number]] Collected: 11/07/23 0506    Specimen: Blood from Arm, Right Updated: 11/07/23 0553    TSH [[id number]]  (Normal) Collected: 11/06/23 0442    Specimen: Blood from Arm, Right Updated: 11/06/23 1020     TSH 2.050 uIU/mL     T4, Free [[id number]]  (Normal) Collected: 11/06/23 0442    Specimen: Blood from Arm, Right Updated: 11/06/23 1020     Free T4 1.13 ng/dL     Narrative:      Results may be falsely increased if patient taking Biotin.      Lipid Panel [[id number]]  (Abnormal) Collected: 11/06/23 0442    Specimen: Blood from Arm, Right Updated: 11/06/23 1006     Total Cholesterol 127 mg/dL      Triglycerides 80 mg/dL      HDL Cholesterol 33 mg/dL      LDL Cholesterol  78 mg/dL      VLDL Cholesterol 16 mg/dL      LDL/HDL Ratio 2.36    Narrative:      Cholesterol Reference Ranges  ([place] Department of Health and Human Services ATP III Classifications)    Desirable           <200 mg/dL  Borderline High    200-239 mg/dL  High Risk          >240 mg/dL      Triglyceride Reference Ranges  ([place].S. Department of Health and Human Services ATP III Classifications)    Normal           <150 mg/dL  Borderline High  150-199 mg/dL  High             200-499 mg/dL  Very High        >500 mg/dL    HDL Reference Ranges  ([place].S. Department of Health and Human Services ATP III Classifications)    Low     <40 mg/dl (major risk factor for CHD)  High    >60 mg/dl ('negative' risk factor for CHD)        LDL Reference Ranges  ([place].S. Department of Health and Human Services ATP III Classifications)    Optimal          <100 mg/dL  Near Optimal     100-129 mg/dL  Borderline High  130-159 mg/dL  High             160-189 mg/dL  Very High        >189 mg/dL    Hepatic Function Panel [[id number]]  (Abnormal) Collected: 11/06/23 0442    Specimen: Blood from Arm, Right Updated: 11/06/23 1006     Total Protein 7.0 g/dL      Albumin 3.3 g/dL      ALT (SGPT) 7 U/L      AST (SGOT) 9 U/L      Alkaline Phosphatase 78 U/L      Total Bilirubin 0.4 mg/dL      Bilirubin, Direct <0.2 mg/dL      Bilirubin, Indirect --     Comment: Unable to calculate       Magnesium [[id number]]  (Normal) Collected: 11/06/23 0442    Specimen: Blood from Arm, Right Updated: 11/06/23 1006     Magnesium 1.9 mg/dL     Hemoglobin A1c [[id number]]  (Normal) Collected: 11/06/23 0442    Specimen: Blood from Arm, Right Updated: 11/06/23 0919     Hemoglobin A1C 5.60 %             Imaging Results (Last 24 Hours)       ** No results found for the last 24 hours. **        LAB RESULTS (LAST 7 DAYS)    CBC  Results from last 7 days   Lab Units 11/07/23  0506 11/06/23 0442 11/05/23  1731   WBC 10*3/mm3 9.90 8.60 9.40   RBC 10*6/mm3 4.66 4.37 4.27   HEMOGLOBIN g/dL 13.5 12.6* 12.4*   HEMATOCRIT % 41.0 38.5 38.2   MCV fL 88.0 88.1 89.4   PLATELETS 10*3/mm3 395 345 367       BMP  Results from last 7 days   Lab Units 11/06/23 0442 11/05/23  1731   SODIUM mmol/L 134*  138   POTASSIUM mmol/L 3.8 3.9   CHLORIDE mmol/L 101 104   CO2 mmol/L 25.0 25.0   BUN mg/dL 11 16   CREATININE mg/dL 0.75* 0.73*   GLUCOSE mg/dL 104* 125*   MAGNESIUM mg/dL 1.9  --        CMP   Results from last 7 days   Lab Units 11/06/23  0442 11/05/23  1731   SODIUM mmol/L 134* 138   POTASSIUM mmol/L 3.8 3.9   CHLORIDE mmol/L 101 104   CO2 mmol/L 25.0 25.0   BUN mg/dL 11 16   CREATININE mg/dL 0.75* 0.73*   GLUCOSE mg/dL 104* 125*   ALBUMIN g/dL 3.3*  --    BILIRUBIN mg/dL 0.4  --    ALK PHOS U/L 78  --    AST (SGOT) U/L 9  --    ALT (SGPT) U/L 7  --          BNP        TROPONIN  Results from last 7 days   Lab Units 11/06/23  0442   HSTROP T ng/L 7       CoAg  Results from last 7 days   Lab Units 11/05/23  1731   INR  1.10   APTT seconds 34.8*       Creatinine Clearance  Estimated Creatinine Clearance: 128.2 mL/min (A) (by C-G formula based on SCr of 0.75 mg/dL (L)).    ABG        Radiology  XR Chest 1 View    Result Date: 11/5/2023  Impression: Hazy groundglass densities. I cannot exclude low-grade pulmonary edema. Electronically Signed: Eliud Kimbrough MD  11/5/2023 5:59 PM EST  Workstation ID: [id number]         EKG            I personally viewed and interpreted the patient's EKG/Telemetry data:    ECHOCARDIOGRAM:    Results for orders placed during the hospital encounter of 11/05/23    Adult Transthoracic Echo Complete W/ Cont if Necessary Per Protocol    Interpretation Summary    Left ventricular ejection fraction appears to be 51 - 55%.    Left ventricular diastolic function is consistent with (grade I) impaired relaxation.    There is calcification of the aortic valve with no significant stenosis.    Moderate aortic valve regurgitation is present.    Mild mitral and tricuspid regurgitation.          STRESS TEST  Results for orders placed during the hospital encounter of 11/05/23    Stress Test With Myocardial Perfusion One Day    Interpretation Summary  Indications  Chest pain    This study was performed under  the direct supervision [place] SALINAS.    Resting ECG  Sinus rhythm old inferior infarction changes    Patient exercised for 3.28 minutes using the Sammy protocol.  The maximum heart rate achieved was 114 and maximum systolic blood pressure 113.  Patient did not have any chest discomfort ST-T wave abnormalities or ectopy.    Cardiolite was used as an imaging agent.    Cardiolite images showed mildly decreased radionuclide uptake in the inferior segments with minimal reperfusion suggestive of inferior infarction with mild ischemia.    Gated SPECT images revealed normal left ventricular size and contractility with ejection fraction of 54%.    Impression  ========    Limited exercise tolerance.  Nuclear images showed decreased radionuclide uptake in the proximal inferior segments with minimal reperfusion suggestive of inferior infarction with mild ischemia.  Low risk study for ischemia.  Gated SPECT images revealed normal left ventricular size and contractility with ejection fraction of 54%.        Cardiolite (Tc-99m sestamibi) stress test    CARDIAC CATHETERIZATION  No results found for this or any previous visit.                OTHER:         Assessment & Plan     Principal Problem:    Chest pain  Active Problems:    Essential hypertension    Coronary artery disease of native artery of native heart with stable angina pectoris    ]]]]]]]]]]]]]]]]]]  Impression  =========  -Chest discomfort-possible angina pectoris although somewhat atypical.    - History of stent x2.  Details not available.  Patient is somewhat nondescript about his symptoms and does not know where and who performed his stents.  Troponin levels are negative.  EKG showed no acute changes.    Stress Cardiolite 11/5/2023 revealed    Limited exercise tolerance.  Nuclear images showed decreased radionuclide uptake in the proximal inferior segments with minimal reperfusion suggestive of inferior infarction with mild ischemia.  Low risk study for ischemia.  Gated  SPECT images revealed normal left ventricular size and contractility with ejection fraction of 54%.    Echocardiogram to be obtained.    - Hypertension dyslipidemia  Cholesterol 127 and LDL 78-11 6/20/2023.  HDL 33    - History of illicit drug use including UDS + methamphetamine.  Patient did meth 3 days prior to the admission.    - Smoker.    - No known allergies.  ==========  Plan  ===========  Patient chest discomfort is somewhat atypical.  Troponin level is negative.  EKG showed no acute changes.  Echocardiogram.  Stress Cardiolite test as above.  Close cardiac monitoring.  Medical treatment at this time.  Consider cardiac catheterization if patient has continued symptoms.    TSH was normal.    Dyslipidemia-lipid panel as above.    Medications were reviewed and updated.  Patient is on aspirin atorvastatin metoprolol XL milligrams a day.    Further plan will depend on patient's progress.    Reviewed and updated 11/7/2023.    [[[[[[[[[[[[[[[           Juliocesar Hudson MD  11/07/23  06:20 EST                 Chronic kidney disease, unspecified CKD stage

## 2023-11-07 NOTE — DISCHARGE SUMMARY
[facility]    Provider, No Known    CHIEF COMPLAINT:     Chest Pain     HISTORY OF PRESENT ILLNESS:    [facility]    ED 11/5/23: 53-year-old male presents with chest pain.  Patient does have a history of coronary artery disease with stent placement many years ago.  He states he has not had any cardiac work-up recently.  He states he no longer follows up with a cardiologist.  He states he developed 2 different episodes of chest pain last night.  Pain was in the left chest with radiation to the left arm.  He had associated shortness of breath.  He also had some nausea and vomiting.  He states symptoms resolved and then today he has just had some mild achiness in the left chest and left arm.  He states there is nothing like it was last night.  He denies any alleviating or exacerbating factors.     Observation 11/6/23: Patient is a 53-year-old male presented to the hospital with complaints of chest pain.  Patient dates his chest pain is midsternal that radiated to back.  Patient states he does have history of coronary disease with stent placement many years ago.  Patient also reports shortness of breath, nausea and weakness.    Past Medical History:   Diagnosis Date    Coronary artery disease      Past Surgical History:   Procedure Laterality Date    CORONARY ANGIOPLASTY WITH STENT PLACEMENT       History reviewed. No pertinent family history.  Social History     Tobacco Use    Smoking status: Every Day     Packs/day: .5     Types: Cigarettes   Vaping Use    Vaping Use: Every day   Substance Use Topics    Alcohol use: Yes     Comment: occasional    Drug use: Yes     Types: Marijuana     Comment: occasional     No medications prior to admission.     Allergies:  Patient has no known allergies.    Immunization History   Administered Date(s) Administered    Tdap 06/11/2023           REVIEW OF SYSTEMS:    Review of Systems   Constitutional: Positive for malaise/fatigue.   HENT: Negative.     Eyes: Negative.     Cardiovascular:  Positive for dyspnea on exertion.   Respiratory: Negative.     Endocrine: Negative.    Hematologic/Lymphatic: Negative.    Skin: Negative.    Musculoskeletal: Negative.    Gastrointestinal: Negative.    Genitourinary: Negative.    Neurological: Negative.    Psychiatric/Behavioral: Negative.     Allergic/Immunologic: Negative.        Vital Signs  Temp:  [98 °F (36.7 °C)-99 °F (37.2 °C)] 98.9 °F (37.2 °C)  Heart Rate:  [74-83] 77  Resp:  [14-16] 14  BP: (116-146)/() 141/108          Physical Exam:  Physical Exam  Vitals and nursing note reviewed.   Constitutional:       Appearance: Normal appearance.   HENT:      Head: Normocephalic and atraumatic.      Right Ear: External ear normal.      Left Ear: External ear normal.      Nose: Nose normal.      Mouth/Throat:      Pharynx: Oropharynx is clear.   Eyes:      Extraocular Movements: Extraocular movements intact.      Conjunctiva/sclera: Conjunctivae normal.      Pupils: Pupils are equal, round, and reactive to light.   Cardiovascular:      Rate and Rhythm: Normal rate and regular rhythm.      Pulses: Normal pulses.      Heart sounds: Normal heart sounds.   Pulmonary:      Effort: Pulmonary effort is normal.      Breath sounds: Normal breath sounds.   Abdominal:      General: Bowel sounds are normal.      Palpations: Abdomen is soft.   Musculoskeletal:         General: Normal range of motion.      Cervical back: Normal range of motion.   Skin:     General: Skin is warm.      Capillary Refill: Capillary refill takes less than 2 seconds.   Neurological:      Mental Status: He is alert and oriented to person, place, and time.   Psychiatric:         Mood and Affect: Mood normal.         Behavior: Behavior normal.         Thought Content: Thought content normal.         Judgment: Judgment normal.         Emotional Behavior:    WNl   Debilities:   none  Results Review:    I reviewed the patient's new clinical results.  Lab Results (most recent)        Procedure Component Value Units Date/Time    Basic Metabolic Panel [[id number]]  (Abnormal) Collected: 11/07/23 0506    Specimen: Blood from Arm, Right Updated: 11/07/23 0621     Glucose 100 mg/dL      BUN 13 mg/dL      Creatinine 0.81 mg/dL      Sodium 135 mmol/L      Potassium 4.2 mmol/L      Chloride 101 mmol/L      CO2 25.0 mmol/L      Calcium 9.7 mg/dL      BUN/Creatinine Ratio 16.0     Anion Gap 9.0 mmol/L      eGFR 104.8 mL/min/1.73     Narrative:      GFR Normal >60  Chronic Kidney Disease <60  Kidney Failure <15      CBC & Differential [[id number]]  (Abnormal) Collected: 11/07/23 0506    Specimen: Blood from Arm, Right Updated: 11/07/23 0555    Narrative:      The following orders were created for panel order CBC & Differential.  Procedure                               Abnormality         Status                     ---------                               -----------         ------                     CBC Auto Differential[[id number]]        Abnormal            Final result                 Please view results for these tests on the individual orders.    CBC Auto Differential [[id number]]  (Abnormal) Collected: 11/07/23 0506    Specimen: Blood from Arm, Right Updated: 11/07/23 0555     WBC 9.90 10*3/mm3      RBC 4.66 10*6/mm3      Hemoglobin 13.5 g/dL      Hematocrit 41.0 %      MCV 88.0 fL      MCH 29.0 pg      MCHC 33.0 g/dL      RDW 15.7 %      RDW-SD 47.7 fl      MPV 6.5 fL      Platelets 395 10*3/mm3      Neutrophil % 57.9 %      Lymphocyte % 28.4 %      Monocyte % 5.1 %      Eosinophil % 7.3 %      Basophil % 1.3 %      Neutrophils, Absolute 5.70 10*3/mm3      Lymphocytes, Absolute 2.80 10*3/mm3      Monocytes, Absolute 0.50 10*3/mm3      Eosinophils, Absolute 0.70 10*3/mm3      Basophils, Absolute 0.10 10*3/mm3      nRBC 0.1 /100 WBC     TSH [[id number]]  (Normal) Collected: 11/06/23 0442    Specimen: Blood from Arm, Right Updated: 11/06/23 1020     TSH 2.050 uIU/mL     T4, Free [[id number]]  (Normal)  Collected: 11/06/23 0442    Specimen: Blood from Arm, Right Updated: 11/06/23 1020     Free T4 1.13 ng/dL     Narrative:      Results may be falsely increased if patient taking Biotin.      Lipid Panel [[id number]]  (Abnormal) Collected: 11/06/23 0442    Specimen: Blood from Arm, Right Updated: 11/06/23 1006     Total Cholesterol 127 mg/dL      Triglycerides 80 mg/dL      HDL Cholesterol 33 mg/dL      LDL Cholesterol  78 mg/dL      VLDL Cholesterol 16 mg/dL      LDL/HDL Ratio 2.36    Narrative:      Cholesterol Reference Ranges  (U.S. Department of Health and Human Services ATP III Classifications)    Desirable          <200 mg/dL  Borderline High    200-239 mg/dL  High Risk          >240 mg/dL      Triglyceride Reference Ranges  (U.S. Department of Health and Human Services ATP III Classifications)    Normal           <150 mg/dL  Borderline High  150-199 mg/dL  High             200-499 mg/dL  Very High        >500 mg/dL    HDL Reference Ranges  (U.S. Department of Health and Human Services ATP III Classifications)    Low     <40 mg/dl (major risk factor for CHD)  High    >60 mg/dl ('negative' risk factor for CHD)        LDL Reference Ranges  (U.S. Department of Health and Human Services ATP III Classifications)    Optimal          <100 mg/dL  Near Optimal     100-129 mg/dL  Borderline High  130-159 mg/dL  High             160-189 mg/dL  Very High        >189 mg/dL    Hepatic Function Panel [[id number]]  (Abnormal) Collected: 11/06/23 0442    Specimen: Blood from Arm, Right Updated: 11/06/23 1006     Total Protein 7.0 g/dL      Albumin 3.3 g/dL      ALT (SGPT) 7 U/L      AST (SGOT) 9 U/L      Alkaline Phosphatase 78 U/L      Total Bilirubin 0.4 mg/dL      Bilirubin, Direct <0.2 mg/dL      Bilirubin, Indirect --     Comment: Unable to calculate       Magnesium [[id number]]  (Normal) Collected: 11/06/23 0442    Specimen: Blood from Arm, Right Updated: 11/06/23 1006     Magnesium 1.9 mg/dL     Hemoglobin A1c [[id number]]   (Normal) Collected: 11/06/23 0442    Specimen: Blood from Arm, Right Updated: 11/06/23 0919     Hemoglobin A1C 5.60 %     Basic Metabolic Panel [[id number]]  (Abnormal) Collected: 11/06/23 0442    Specimen: Blood from Arm, Right Updated: 11/06/23 0526     Glucose 104 mg/dL      BUN 11 mg/dL      Creatinine 0.75 mg/dL      Sodium 134 mmol/L      Potassium 3.8 mmol/L      Chloride 101 mmol/L      CO2 25.0 mmol/L      Calcium 9.0 mg/dL      BUN/Creatinine Ratio 14.7     Anion Gap 8.0 mmol/L      eGFR 107.2 mL/min/1.73     Narrative:      GFR Normal >60  Chronic Kidney Disease <60  Kidney Failure <15      High Sensitivity Troponin T [[id number]]  (Normal) Collected: 11/06/23 0442    Specimen: Blood from Arm, Right Updated: 11/06/23 0526     HS Troponin T 7 ng/L     Narrative:      High Sensitive Troponin T Reference Range:  <10.0 ng/L- Negative Female for AMI  <15.0 ng/L- Negative Male for AMI  >=10 - Abnormal Female indicating possible myocardial injury.  >=15 - Abnormal Male indicating possible myocardial injury.   Clinicians would have to utilize clinical acumen, EKG, Troponin, and serial changes to determine if it is an Acute Myocardial Infarction or myocardial injury due to an underlying chronic condition.         CBC & Differential [[id number]]  (Abnormal) Collected: 11/06/23 0442    Specimen: Blood from Arm, Right Updated: 11/06/23 0455    Narrative:      The following orders were created for panel order CBC & Differential.  Procedure                               Abnormality         Status                     ---------                               -----------         ------                     CBC Auto Differential[[id number]]        Abnormal            Final result                 Please view results for these tests on the individual orders.    CBC Auto Differential [[id number]]  (Abnormal) Collected: 11/06/23 0442    Specimen: Blood from Arm, Right Updated: 11/06/23 0455     WBC 8.60 10*3/mm3      RBC 4.37  10*6/mm3      Hemoglobin 12.6 g/dL      Hematocrit 38.5 %      MCV 88.1 fL      MCH 28.9 pg      MCHC 32.8 g/dL      RDW 15.8 %      RDW-SD 47.7 fl      MPV 6.1 fL      Platelets 345 10*3/mm3      Neutrophil % 51.9 %      Lymphocyte % 32.8 %      Monocyte % 5.3 %      Eosinophil % 8.7 %      Basophil % 1.3 %      Neutrophils, Absolute 4.50 10*3/mm3      Lymphocytes, Absolute 2.80 10*3/mm3      Monocytes, Absolute 0.50 10*3/mm3      Eosinophils, Absolute 0.70 10*3/mm3      Basophils, Absolute 0.10 10*3/mm3      nRBC 0.0 /100 WBC     Urine Drug Screen - Urine, Clean Catch [[id number]]  (Abnormal) Collected: 11/05/23 1754    Specimen: Urine, Clean Catch Updated: 11/05/23 1821     Amphet/Methamphet, Screen Positive     Barbiturates Screen, Urine Negative     Benzodiazepine Screen, Urine Negative     Cocaine Screen, Urine Negative     Opiate Screen Negative     THC, Screen, Urine Negative     Methadone Screen, Urine Negative     Oxycodone Screen, Urine Negative    Narrative:      Negative Thresholds Per Drugs Screened:    Amphetamines                 500 ng/ml  Barbiturates                 200 ng/ml  Benzodiazepines              100 ng/ml  Cocaine                      300 ng/ml  Methadone                    300 ng/ml  Opiates                      300 ng/ml  Oxycodone                    100 ng/ml  THC                           50 ng/ml    The Normal Value for all drugs tested is negative. This report includes final unconfirmed screening results to be used for medical treatment purposes only. Unconfirmed results must not be used for non-medical purposes such as employment or legal testing. Clinical consideration should be applied to any drug of abuse test, particularly when unconfirmed results are used.          All urine drugs of abuse requests without chain of custody are for medical screening purposes only.  False positives are possible.      Single High Sensitivity Troponin T [[id number]]  (Normal) Collected: 11/05/23  1731    Specimen: Blood Updated: 11/05/23 1800     HS Troponin T <6 ng/L     Narrative:      High Sensitive Troponin T Reference Range:  <10.0 ng/L- Negative Female for AMI  <15.0 ng/L- Negative Male for AMI  >=10 - Abnormal Female indicating possible myocardial injury.  >=15 - Abnormal Male indicating possible myocardial injury.   Clinicians would have to utilize clinical acumen, EKG, Troponin, and serial changes to determine if it is an Acute Myocardial Infarction or myocardial injury due to an underlying chronic condition.         BNP [[id number]]  (Normal) Collected: 11/05/23 1731    Specimen: Blood Updated: 11/05/23 1800     proBNP 180.4 pg/mL     Narrative:      This assay is used as an aid in the diagnosis of individuals suspected of having heart failure. It can be used as an aid in the diagnosis of acute decompensated heart failure (ADHF) in patients presenting with signs and symptoms of ADHF to the emergency department (ED). In addition, NT-proBNP of <300 pg/mL indicates ADHF is not likely.    Age Range Result Interpretation  NT-proBNP Concentration (pg/mL:      <50             Positive            >450                   Gray                 300-450                    Negative             <300    50-75           Positive            >900                  Gray                300-900                  Negative            <300      >75             Positive            >1800                  Gray                300-1800                  Negative            <300    Protime-INR [[id number]]  (Abnormal) Collected: 11/05/23 1731    Specimen: Blood Updated: 11/05/23 1750     Protime 11.9 Seconds      INR 1.10    aPTT [[id number]]  (Abnormal) Collected: 11/05/23 1731    Specimen: Blood Updated: 11/05/23 1750     PTT 34.8 seconds             Imaging Results (Most Recent)       Procedure Component Value Units Date/Time    XR Chest 1 View [[id number]] Collected: 11/05/23 1758     Updated: 11/05/23 1801    Narrative:      XR  CHEST 1 VW    Date of Exam: 11/5/2023 5:40 PM EST    Indication: Chest pain    Comparison: 2/8/2023.    Findings:  Heart size is prominent. There is some hazy groundglass density. I cannot completely exclude low-grade pulmonary edema. There is no dense airspace consolidation, pleural effusion, or pneumothorax. There is mild thoracic spondylosis.      Impression:      Impression:  Hazy groundglass densities. I cannot exclude low-grade pulmonary edema.      Electronically Signed: Eliud Kimbrough MD    11/5/2023 5:59 PM EST    Workstation ID: [id number]          reviewed    ECG/EMG Results (most recent)       Procedure Component Value Units Date/Time    ECG 12 Lead Chest Pain [[id number]] Collected: 11/05/23 1659     Updated: 11/06/23 0636     QT Interval 386 ms      QTC Interval 455 ms     Narrative:      HEART RATE= 83  bpm  RR Interval= 720  ms  OR Interval= 152  ms  P Horizontal Axis= -8  deg  P Front Axis= 63  deg  QRSD Interval= 105  ms  QT Interval= 386  ms  QTcB= 455  ms  QRS Axis= 37  deg  T Wave Axis= 50  deg  - ABNORMAL ECG -  Sinus rhythm  Probable left atrial enlargement  Anterolateral infarct, age indeterminate  When compared with ECG of 12-Jun-2023 8:20:41,  No significant change  Electronically Signed By: Elvin Caceres ([facility]) 06-Nov-2023 06:35:59  Date and Time of Study: 2023-11-05 16:59:56    Adult Transthoracic Echo Complete W/ Cont if Necessary Per Protocol [[id number]] Resulted: 11/06/23 1755     Updated: 11/06/23 1757     LVIDd 3.6 cm      LVIDs 2.8 cm      IVSd 1.30 cm      LVPWd 1.00 cm      FS 22.2 %      IVS/LVPW 1.30 cm      ESV(cubed) 22.0 ml      LV Sys Vol (BSA corrected) 32.1 cm2      EDV(cubed) 46.7 ml      LV Hill Vol (BSA corrected) 72.7 cm2      LV mass(C)d 132.7 grams      LVOT area 2.8 cm2      LVOT diam 1.90 cm      EDV(MOD-sp4) 144.0 ml      ESV(MOD-sp4) 63.7 ml      SV(MOD-sp4) 80.3 ml      SI(MOD-sp4) 40.5 ml/m2      EF(MOD-sp4) 55.8 %      MV E max anish 51.9 cm/sec      MV A max anish 89.6  cm/sec      MV dec time 0.22 sec      MV E/A 0.58     Pulm A Revs Dur 0.12 sec      LA ESV Index (BP) 26.8 ml/m2      Med Peak E' Duane 7.6 cm/sec      Lat Peak E' [place]ane 9.0 cm/sec      Avg E/e' ratio 6.25     SV(LVOT) 62.1 ml      RVIDd 3.3 cm      RV Base 4.2 cm      RV Mid 3.7 cm      RV Length 8.0 cm      TAPSE (>1.6) 2.29 cm      RV S' 15.6 cm/sec      LA dimension (2D)  3.2 cm      Pulm Sys Duane 52.9 cm/sec      Pulm [place] 47.0 cm/sec      Pulm S/D 1.13     Pulm A Revs Duane 26.0 cm/sec      LV V1 max 117.0 cm/sec      LV V1 max PG 5.5 mmHg      LV V1 mean PG 3.0 mmHg      LV V1 VTI 21.9 cm      Ao pk duane 179.0 cm/sec      Ao max PG 12.8 mmHg      Ao mean PG 6.0 mmHg      Ao V2 VTI 30.7 cm      TITI(I,D) 2.02 cm2      AI P1/2t 466.7 msec      MV max PG 3.2 mmHg      MV mean PG 2.00 mmHg      MV V2 VTI 25.8 cm      MV P1/2t 102.5 msec      MVA(P1/2t) 2.15 cm2      MVA(VTI) 2.41 cm2      MV dec slope 206.0 cm/sec2      RV V1 max PG 3.1 mmHg      RV V1 max 88.6 cm/sec      RV V1 VTI 16.5 cm      PA V2 max 96.5 cm/sec      Sinus 3.5 cm     Narrative:        Left ventricular ejection fraction appears to be 51 - 55%.    Left ventricular diastolic function is consistent with (grade I)   impaired relaxation.    There is calcification of the aortic valve with no significant   stenosis.    Moderate aortic valve regurgitation is present.    Mild mitral and tricuspid regurgitation.      SCANNED - TELEMETRY   [[id number]] Resulted: 11/05/23     Updated: 11/07/23 0811    SCANNED - TELEMETRY   [[id number]] Resulted: 11/05/23     Updated: 11/07/23 0811    SCANNED - TELEMETRY   [[id number]] Resulted: 11/05/23     Updated: 11/07/23 0811    SCANNED - TELEMETRY   [[id number]] Resulted: 11/05/23     Updated: 11/07/23 0823    SCANNED - TELEMETRY   [[id number]] Resulted: 11/05/23     Updated: 11/07/23 0851    SCANNED - TELEMETRY   [[id number]] Resulted: 11/05/23     Updated: 11/07/23 0851    SCANNED - TELEMETRY   [[id number]] Resulted: 11/05/23      Updated: 11/07/23 0903          reviewed        Results for orders placed during the hospital encounter of 11/05/23    Adult Transthoracic Echo Complete W/ Cont if Necessary Per Protocol    Interpretation Summary    Left ventricular ejection fraction appears to be 51 - 55%.    Left ventricular diastolic function is consistent with (grade I) impaired relaxation.    There is calcification of the aortic valve with no significant stenosis.    Moderate aortic valve regurgitation is present.    Mild mitral and tricuspid regurgitation.      Microbiology Results (last 10 days)       ** No results found for the last 240 hours. **            Assessment & Plan     Chest pain    Essential hypertension    Coronary artery disease of native artery of native heart with stable angina pectoris        Chest pain        Lab Results   Component Value Date     TROPONINT 7 11/06/2023     TROPONINT <6 11/05/2023     TROPONINT <6 02/08/2023      -  -Chest X-ray: Hazy groundglass densities with possible pulmonary edema  -EKG: Sinus rhythm probable left atrial enlargement desiccant signage from previous EKG  -Stress Test ordered low risk study suggestive of inferior infarct with mild ischemia with a EF of 54%  -Echocardiogram pending  -Telemetry  -Cardiology consulted      Substance abuse  -UDS positive for amphetamines  -Social work consulted    I discussed the patients findings and my recommendations with patient.     Discharge Diagnosis:      Chest pain    Essential hypertension    Coronary artery disease of native artery of native heart with stable angina pectoris      Hospital Course  Patient is a 53 y.o. male presented with chest pain.  Troponins unremarkable.  BMP unremarkable.  Chest x-ray showed hazy groundglass densities possible pulmonary edema with unremarkable BNP.  EKG shows sinus rhythm probable left atrial enlargement.  Stress test showed low risk study/inferior infarct with mild ischemia in the 54%.  Echocardiogram  showed EF of 51 to 55% with grade 1 diastolic dysfunction, calcification aortic valve with no significant stenosis, moderate aortic valve regurgitation, mild mitral and tricuspid regurgitation.  Patient UDS positive for amphetamines.  Social was consulted and patient to follow-up with [facility].  No signs of HI or SI.  Patient to follow-up with primary care through [facility] and cardiology referral outpatient.     Past Medical History:     Past Medical History:   Diagnosis Date    Coronary artery disease        Past Surgical History:     Past Surgical History:   Procedure Laterality Date    CORONARY ANGIOPLASTY WITH STENT PLACEMENT         Social History:   Social History     Socioeconomic History    Marital status: Legally    Tobacco Use    Smoking status: Every Day     Packs/day: .5     Types: Cigarettes   Vaping Use    Vaping Use: Every day   Substance and Sexual Activity    Alcohol use: Yes     Comment: occasional    Drug use: Yes     Types: Marijuana     Comment: occasional    Sexual activity: Defer       Procedures Performed         Consults:   Consults       Date and Time Order Name Status Description    11/6/2023 12:38 PM Inpatient Cardiology Consult Completed             Condition on Discharge:     Stable    Discharge Disposition  Home or Self Care    Discharge Medications     Discharge Medications        New Medications        Instructions Start Date   aspirin 81 MG EC tablet   81 mg, Oral, Daily   Start Date: November 8, 2023     atorvastatin 10 MG tablet  Commonly known as: LIPITOR   10 mg, Oral, Nightly      metoprolol succinate XL 25 MG 24 hr tablet  Commonly known as: TOPROL-XL   25 mg, Oral, Every 24 Hours Scheduled   Start Date: November 8, 2023            Continue These Medications        Instructions Start Date   ibuprofen 400 MG tablet  Commonly known as: ADVIL,MOTRIN   400 mg, Oral, Every 6 Hours PRN      ondansetron 4 MG tablet  Commonly known as: ZOFRAN   4 mg, Oral, Every 6 Hours  PRN      traMADol 50 MG tablet  Commonly known as: ULTRAM   50 mg, Oral, Every 6 Hours PRN               Discharge Diet:   Diet Instructions       Diet: Cardiac Diets; Healthy Heart (2-3 Na+); Regular Texture (IDDSI 7); Thin (IDDSI 0)      Discharge Diet: Cardiac Diets    Cardiac Diet: Healthy Heart (2-3 Na+)    Texture: Regular Texture (IDDSI 7)    Fluid Consistency: Thin (IDDSI 0)            Activity at Discharge:   Activity Instructions       Activity as Tolerated      Measure Blood Pressure              Follow-up Appointments  No future appointments.  Additional Instructions for the Follow-ups that You Need to Schedule       Discharge Follow-up with PCP   As directed       Currently Documented PCP:    Provider, No Known    PCP Phone Number:    None     Follow Up Details: 7-10 days                Test Results Pending at Discharge       Risk for Readmission (LACE) Score: 3 (11/7/2023  6:00 AM)          JAYLIN Flores  11/07/23  10:42 EST        I spent 35 minutes caring for Danial on this date of service. This time includes time spent by me in the following activities: reviewing tests, obtaining and/or reviewing a separately obtained history, performing a medically appropriate examination and/or evaluation, counseling and educating the patient/family/caregiver, ordering medications, tests, or procedures, referring and communicating with other health care professionals, documenting information in the medical record, independently interpreting results and communicating that information with the patient/family/caregiver, and care coordination.

## 2023-11-08 NOTE — CASE MANAGEMENT/SOCIAL WORK
Case Management Discharge Note      Final Note: Routine home    Provided Post Acute Provider List?: N/A  Provided Post Acute Provider Quality & Resource List?: N/A    Selected Continued Care - Discharged on 11/7/2023 Admission date: 11/5/2023 - Discharge disposition: Home or Self Care         Transportation Services  Private: Car    Final Discharge Disposition Code: 01 - home or self-care

## 2023-12-01 NOTE — PROGRESS NOTES
Encounter Date:12/05/2023 11/7/2023      Patient ID: Danial Wild is a 53 y.o. male.    Chief Complaint:  Chest discomfort  Status post stents  Hypertension  Dyslipidemia    History of Present Illness  Patient recently was admitted to [facility] with chest discomfort.  Chest pain was somewhat atypical.  Patient had ischemic cardiac work-up and was discharged home.    Since I have last seen, the patient has been without any chest discomfort ,shortness of breath, palpitations, dizziness or syncope.  Denies having any headache ,abdominal pain ,nausea, vomiting , diarrhea constipation, loss of weight or loss of appetite.  Denies having any excessive bruising ,hematuria or blood in the stool.    Review of all systems negative except as indicated.    Reviewed ROS.    Assessment and Plan       ]]]]]]]]]]]]]]]]]]  Impression  =========  -Chest discomfort-possible angina pectoris although somewhat atypical.-Improved     - History of stent x2.  Details not available.  Patient is somewhat nondescript about his symptoms and does not know where and who performed his stents.  Troponin levels are negative.  EKG showed no acute changes.    - Moderate aortic regurgitation    Echocardiogram 11/6/2023    Left ventricular ejection fraction appears to be 51 - 55%.    Left ventricular diastolic function is consistent with (grade I) impaired relaxation.    There is calcification of the aortic valve with no significant stenosis.    Moderate aortic valve regurgitation is present.    Mild mitral and tricuspid regurgitation.    Stress Cardiolite 11/5/2023 revealed     Limited exercise tolerance.  Nuclear images showed decreased radionuclide uptake in the proximal inferior segments with minimal reperfusion suggestive of inferior infarction with mild ischemia.  Low risk study for ischemia.  Gated SPECT images revealed normal left ventricular size and contractility with ejection fraction of 54%.     Echocardiogram to be  obtained.     - Hypertension dyslipidemia  Cholesterol 127 and LDL 78-11 6/20/2023.  HDL 33     - History of illicit drug use including UDS + methamphetamine.  Patient did meth 3 days prior to the admission.     - Smoker.     - No known allergies.  ==========  Plan  ===========  Status post stent  Patient is not having any angina pectoris or congestive heart failure.  Echocardiogram-as above.  Stress Cardiolite test-as above    Moderate aortic regurgitation-echocardiogram-as above.  Asymptomatic    Hypertension-well-controlled  120/70.  Patient is on metoprolol.    Dyslipidemia-on atorvastatin.    Medications were reviewed and updated.  Patient is on aspirin atorvastatin metoprolol XL milligrams a day.  Patient was given prescription for sublingual nitroglycerin.     Further plan will depend on patient's progress.     Reviewed and updated-12/5/2023  [[[[[[[[[[[[[[[         Diagnosis Plan   1. Status post placement of cardiac pacemaker        2. Essential hypertension        3. Dyslipidemia        4. Chest discomfort        LAB RESULTS (LAST 7 DAYS)    CBC        BMP        CMP         BNP        TROPONIN        CoAg        Creatinine Clearance  Estimated Creatinine Clearance: 129 mL/min (by C-G formula based on SCr of 0.81 mg/dL).    ABG        Radiology  No radiology results for the last day                The following portions of the patient's history were reviewed and updated as appropriate: allergies, current medications, past family history, past medical history, past social history, past surgical history, and problem list.    Review of Systems   Constitutional: Negative for malaise/fatigue.   Cardiovascular:  Negative for chest pain, dyspnea on exertion, leg swelling and palpitations.   Respiratory:  Positive for shortness of breath. Negative for cough.    Gastrointestinal:  Negative for abdominal pain, nausea and vomiting.   Neurological:  Positive for light-headedness. Negative for dizziness, focal  weakness, headaches and numbness.   All other systems reviewed and are negative.        Current Outpatient Medications:     aspirin 81 MG EC tablet, Take 1 tablet by mouth Daily., Disp: 90 tablet, Rfl: 0    atorvastatin (LIPITOR) 10 MG tablet, Take 1 tablet by mouth Every Night., Disp: 90 tablet, Rfl: 0    CeleXA 20 MG tablet, 1 tablet., Disp: , Rfl:     ibuprofen (ADVIL,MOTRIN) 400 MG tablet, Take 1 tablet by mouth Every 6 (Six) Hours As Needed for Mild Pain., Disp: , Rfl:     metoprolol succinate XL (TOPROL-XL) 25 MG 24 hr tablet, Take 1 tablet by mouth Daily., Disp: 90 tablet, Rfl: 0    OLANZapine (zyPREXA) 20 MG tablet, 1 tablet., Disp: , Rfl:     ondansetron (ZOFRAN) 4 MG tablet, Take 1 tablet by mouth Every 6 (Six) Hours As Needed for Nausea or Vomiting., Disp: 30 tablet, Rfl: 0    tamsulosin (FLOMAX) 0.4 MG capsule 24 hr capsule, 1 capsule., Disp: , Rfl:     traMADol (ULTRAM) 50 MG tablet, Take 1 tablet by mouth Every 6 (Six) Hours As Needed for Moderate Pain., Disp: 10 tablet, Rfl: 0    traZODone (DESYREL) 100 MG tablet, 1 tablet., Disp: , Rfl:     No Known Allergies    History reviewed. No pertinent family history.    Past Surgical History:   Procedure Laterality Date    CORONARY ANGIOPLASTY WITH STENT PLACEMENT         Past Medical History:   Diagnosis Date    Coronary artery disease        History reviewed. No pertinent family history.    Social History     Socioeconomic History    Marital status: Legally    Tobacco Use    Smoking status: Every Day     Packs/day: .5     Types: Cigarettes     Passive exposure: Current    Smokeless tobacco: Never   Vaping Use    Vaping Use: Never used   Substance and Sexual Activity    Alcohol use: Yes     Comment: occasional    Drug use: Yes     Types: Marijuana     Comment: occasional    Sexual activity: Defer         Procedures      Objective:       Physical Exam    /71 (BP Location: Right arm, Patient Position: Sitting, Cuff Size: Adult)   Pulse 98   Ht  "177.8 cm (70\")   Wt 87.5 kg (193 lb)   SpO2 98%   BMI 27.69 kg/m²   The patient is alert, oriented and in no distress.    Vital signs as noted above.    Head and neck revealed no carotid bruits or jugular venous distension.  No thyromegaly or lymphadenopathy is present.    Lungs clear.  No wheezing.  Breath sounds are normal bilaterally.    Heart normal first and second heart sounds.  No murmur..  No pericardial rub is present.  No gallop is present.    Abdomen soft and nontender.  No organomegaly is present.    Extremities revealed good peripheral pulses without any pedal edema.    Skin warm and dry.    Musculoskeletal system is grossly normal.    CNS grossly normal.    Reviewed and updated.        "

## 2023-12-05 ENCOUNTER — OFFICE VISIT (OUTPATIENT)
Dept: CARDIOLOGY | Facility: CLINIC | Age: 53
End: 2023-12-05
Payer: MEDICAID

## 2023-12-05 VITALS
HEART RATE: 98 BPM | OXYGEN SATURATION: 98 % | HEIGHT: 70 IN | WEIGHT: 193 LBS | DIASTOLIC BLOOD PRESSURE: 71 MMHG | SYSTOLIC BLOOD PRESSURE: 120 MMHG | BODY MASS INDEX: 27.63 KG/M2

## 2023-12-05 DIAGNOSIS — E78.5 DYSLIPIDEMIA: ICD-10-CM

## 2023-12-05 DIAGNOSIS — R07.89 CHEST DISCOMFORT: ICD-10-CM

## 2023-12-05 DIAGNOSIS — Z95.0 STATUS POST PLACEMENT OF CARDIAC PACEMAKER: Primary | ICD-10-CM

## 2023-12-05 DIAGNOSIS — I10 ESSENTIAL HYPERTENSION: ICD-10-CM

## 2023-12-05 RX ORDER — TAMSULOSIN HYDROCHLORIDE 0.4 MG/1
0.4 CAPSULE ORAL
COMMUNITY
Start: 2023-08-15

## 2023-12-05 RX ORDER — ASPIRIN 81 MG/1
81 TABLET ORAL DAILY
Qty: 90 TABLET | Refills: 3 | Status: SHIPPED | OUTPATIENT
Start: 2023-12-05

## 2023-12-05 RX ORDER — CITALOPRAM HYDROBROMIDE 20 MG
20 TABLET ORAL
COMMUNITY
Start: 2023-08-15

## 2023-12-05 RX ORDER — NITROGLYCERIN 0.4 MG/1
TABLET SUBLINGUAL
Qty: 25 TABLET | Refills: 3 | Status: SHIPPED | OUTPATIENT
Start: 2023-12-05

## 2023-12-05 RX ORDER — TRAZODONE HYDROCHLORIDE 100 MG/1
100 TABLET ORAL
COMMUNITY
Start: 2023-08-15

## 2023-12-05 RX ORDER — OLANZAPINE 20 MG/1
20 TABLET ORAL
COMMUNITY
Start: 2023-08-15

## 2023-12-05 RX ORDER — METOPROLOL SUCCINATE 25 MG/1
25 TABLET, EXTENDED RELEASE ORAL
Qty: 90 TABLET | Refills: 0 | Status: SHIPPED | OUTPATIENT
Start: 2023-12-05

## 2023-12-05 RX ORDER — ATORVASTATIN CALCIUM 10 MG/1
10 TABLET, FILM COATED ORAL NIGHTLY
Qty: 90 TABLET | Refills: 3 | Status: SHIPPED | OUTPATIENT
Start: 2023-12-05

## 2023-12-05 NOTE — TELEPHONE ENCOUNTER
Rx Refill Note  Requested Prescriptions     Signed Prescriptions Disp Refills    metoprolol succinate XL (TOPROL-XL) 25 MG 24 hr tablet 90 tablet 0     Sig: Take 1 tablet by mouth Daily.     Authorizing Provider: DANIELLE GARCIA     Ordering User: ALYSHA MONCADA    aspirin 81 MG EC tablet 90 tablet 3     Sig: Take 1 tablet by mouth Daily.     Authorizing Provider: DANIELLE GARCIA     Ordering User: ALYSHA MONCADA    atorvastatin (LIPITOR) 10 MG tablet 90 tablet 3     Sig: Take 1 tablet by mouth Every Night.     Authorizing Provider: DANIELLE GARCIA     Ordering User: ALYSHA MONCADA      Last office visit with prescribing clinician: 12/5/2023   Last telemedicine visit with prescribing clinician: Visit date not found   Next office visit with prescribing clinician: 6/5/2024                         Would you like a call back once the refill request has been completed: [] Yes [] No    If the office needs to give you a call back, can they leave a voicemail: [] Yes [] No    Alysha Moncada MA  12/05/23, 15:36 EST

## 2024-06-03 RX ORDER — METOPROLOL SUCCINATE 25 MG/1
25 TABLET, EXTENDED RELEASE ORAL
Qty: 90 TABLET | Refills: 0 | Status: SHIPPED | OUTPATIENT
Start: 2024-06-03

## 2024-06-03 NOTE — TELEPHONE ENCOUNTER
Rx Refill Note  Requested Prescriptions     Pending Prescriptions Disp Refills    metoprolol succinate XL (TOPROL-XL) 25 MG 24 hr tablet [Pharmacy Med Name: METOPROLOL SUC 25MG ER^] 90 tablet 0     Sig: TAKE 1 TABLET BY MOUTH DAILY.      Last office visit with prescribing clinician: 12/5/2023   Last telemedicine visit with prescribing clinician: Visit date not found   Next office visit with prescribing clinician: 6/5/2024                         Would you like a call back once the refill request has been completed: [] Yes [] No    If the office needs to give you a call back, can they leave a voicemail: [] Yes [] No    Olivia Burdick MA  06/03/24, 07:38 EDT

## 2024-08-08 ENCOUNTER — HOSPITAL ENCOUNTER (EMERGENCY)
Facility: HOSPITAL | Age: 54
Discharge: HOME | End: 2024-08-09
Payer: MEDICAID

## 2024-08-08 DIAGNOSIS — R31.9 HEMATURIA, UNSPECIFIED TYPE: Primary | ICD-10-CM

## 2024-08-08 DIAGNOSIS — E86.0 DEHYDRATION: ICD-10-CM

## 2024-08-08 PROCEDURE — 99284 EMERGENCY DEPT VISIT MOD MDM: CPT

## 2024-08-08 RX ORDER — SODIUM CHLORIDE 0.9 % (FLUSH) 0.9 %
10 SYRINGE (ML) INJECTION AS NEEDED
Status: DISCONTINUED | OUTPATIENT
Start: 2024-08-08 | End: 2024-08-09 | Stop reason: HOSPADM

## 2024-08-09 ENCOUNTER — APPOINTMENT (OUTPATIENT)
Dept: CT IMAGING | Facility: HOSPITAL | Age: 54
End: 2024-08-09
Payer: MEDICAID

## 2024-08-09 VITALS
WEIGHT: 236.77 LBS | HEIGHT: 70 IN | OXYGEN SATURATION: 97 % | SYSTOLIC BLOOD PRESSURE: 99 MMHG | RESPIRATION RATE: 17 BRPM | DIASTOLIC BLOOD PRESSURE: 58 MMHG | HEART RATE: 78 BPM | TEMPERATURE: 98.9 F | BODY MASS INDEX: 33.9 KG/M2

## 2024-08-09 LAB
ALBUMIN SERPL-MCNC: 4.2 G/DL (ref 3.5–5.2)
ALBUMIN/GLOB SERPL: 1.2 G/DL
ALP SERPL-CCNC: 107 U/L (ref 39–117)
ALT SERPL W P-5'-P-CCNC: 12 U/L (ref 1–41)
ANION GAP SERPL CALCULATED.3IONS-SCNC: 12.1 MMOL/L (ref 5–15)
AST SERPL-CCNC: 22 U/L (ref 1–40)
BACTERIA UR QL AUTO: ABNORMAL /HPF
BASOPHILS # BLD AUTO: 0.07 10*3/MM3 (ref 0–0.2)
BASOPHILS NFR BLD AUTO: 0.6 % (ref 0–1.5)
BILIRUB SERPL-MCNC: 1.2 MG/DL (ref 0–1.2)
BILIRUB UR QL STRIP: NEGATIVE
BUN SERPL-MCNC: 21 MG/DL (ref 6–20)
BUN/CREAT SERPL: 19.6 (ref 7–25)
CALCIUM SPEC-SCNC: 8.8 MG/DL (ref 8.6–10.5)
CHLORIDE SERPL-SCNC: 98 MMOL/L (ref 98–107)
CLARITY UR: CLEAR
CO2 SERPL-SCNC: 21.9 MMOL/L (ref 22–29)
COLOR UR: ABNORMAL
CREAT SERPL-MCNC: 1.07 MG/DL (ref 0.76–1.27)
DEPRECATED RDW RBC AUTO: 47.4 FL (ref 37–54)
EGFRCR SERPLBLD CKD-EPI 2021: 82 ML/MIN/1.73
EOSINOPHIL # BLD AUTO: 0.29 10*3/MM3 (ref 0–0.4)
EOSINOPHIL NFR BLD AUTO: 2.6 % (ref 0.3–6.2)
ERYTHROCYTE [DISTWIDTH] IN BLOOD BY AUTOMATED COUNT: 14.3 % (ref 12.3–15.4)
GLOBULIN UR ELPH-MCNC: 3.4 GM/DL
GLUCOSE SERPL-MCNC: 109 MG/DL (ref 65–99)
GLUCOSE UR STRIP-MCNC: NEGATIVE MG/DL
HCT VFR BLD AUTO: 38.5 % (ref 37.5–51)
HGB BLD-MCNC: 12.9 G/DL (ref 13–17.7)
HGB UR QL STRIP.AUTO: ABNORMAL
HOLD SPECIMEN: NORMAL
HOLD SPECIMEN: NORMAL
HYALINE CASTS UR QL AUTO: ABNORMAL /LPF
IMM GRANULOCYTES # BLD AUTO: 0.05 10*3/MM3 (ref 0–0.05)
IMM GRANULOCYTES NFR BLD AUTO: 0.4 % (ref 0–0.5)
KETONES UR QL STRIP: NEGATIVE
LEUKOCYTE ESTERASE UR QL STRIP.AUTO: ABNORMAL
LYMPHOCYTES # BLD AUTO: 2.25 10*3/MM3 (ref 0.7–3.1)
LYMPHOCYTES NFR BLD AUTO: 20.1 % (ref 19.6–45.3)
MCH RBC QN AUTO: 30.4 PG (ref 26.6–33)
MCHC RBC AUTO-ENTMCNC: 33.5 G/DL (ref 31.5–35.7)
MCV RBC AUTO: 90.6 FL (ref 79–97)
MONOCYTES # BLD AUTO: 1.11 10*3/MM3 (ref 0.1–0.9)
MONOCYTES NFR BLD AUTO: 9.9 % (ref 5–12)
NEUTROPHILS NFR BLD AUTO: 66.4 % (ref 42.7–76)
NEUTROPHILS NFR BLD AUTO: 7.41 10*3/MM3 (ref 1.7–7)
NITRITE UR QL STRIP: NEGATIVE
NRBC BLD AUTO-RTO: 0 /100 WBC (ref 0–0.2)
PH UR STRIP.AUTO: 5.5 [PH] (ref 5–8)
PLATELET # BLD AUTO: 230 10*3/MM3 (ref 140–450)
PMV BLD AUTO: 8.1 FL (ref 6–12)
POTASSIUM SERPL-SCNC: 3.8 MMOL/L (ref 3.5–5.2)
PROT SERPL-MCNC: 7.6 G/DL (ref 6–8.5)
PROT UR QL STRIP: ABNORMAL
RBC # BLD AUTO: 4.25 10*6/MM3 (ref 4.14–5.8)
RBC # UR STRIP: ABNORMAL /HPF
REF LAB TEST METHOD: ABNORMAL
SODIUM SERPL-SCNC: 132 MMOL/L (ref 136–145)
SP GR UR STRIP: 1.02 (ref 1–1.03)
SQUAMOUS #/AREA URNS HPF: ABNORMAL /HPF
UROBILINOGEN UR QL STRIP: ABNORMAL
WBC # UR STRIP: ABNORMAL /HPF
WBC NRBC COR # BLD AUTO: 11.18 10*3/MM3 (ref 3.4–10.8)
WHOLE BLOOD HOLD COAG: NORMAL
WHOLE BLOOD HOLD SPECIMEN: NORMAL

## 2024-08-09 PROCEDURE — 87086 URINE CULTURE/COLONY COUNT: CPT

## 2024-08-09 PROCEDURE — 85025 COMPLETE CBC W/AUTO DIFF WBC: CPT

## 2024-08-09 PROCEDURE — 74176 CT ABD & PELVIS W/O CONTRAST: CPT

## 2024-08-09 PROCEDURE — 80053 COMPREHEN METABOLIC PANEL: CPT

## 2024-08-09 PROCEDURE — 81001 URINALYSIS AUTO W/SCOPE: CPT

## 2024-08-09 PROCEDURE — 25810000003 LACTATED RINGERS SOLUTION

## 2024-08-09 RX ADMIN — SODIUM CHLORIDE, POTASSIUM CHLORIDE, SODIUM LACTATE AND CALCIUM CHLORIDE 1000 ML: 600; 310; 30; 20 INJECTION, SOLUTION INTRAVENOUS at 01:19

## 2024-08-09 NOTE — ED PROVIDER NOTES
Subjective   Chief Complaint   Patient presents with    Spasms       History of Present Illness  Patient is a 54-year-old man who reports to the ED today with complaints of dehydration and charley horses in his calves and thighs.  Patient is also having kidney pain.  Patient reports that he has been drinking Pedialyte and today has had half a gallon.  Patient denies any cough, fever, rash, shortness of breath, paresthesia, altered mental status, or confusion.  Review of Systems  Per hpi  Past Medical History:   Diagnosis Date    Coronary artery disease        No Known Allergies    Past Surgical History:   Procedure Laterality Date    CORONARY ANGIOPLASTY WITH STENT PLACEMENT         History reviewed. No pertinent family history.    Social History     Socioeconomic History    Marital status: Legally    Tobacco Use    Smoking status: Every Day     Current packs/day: 0.50     Types: Cigarettes     Passive exposure: Current    Smokeless tobacco: Never   Vaping Use    Vaping status: Never Used   Substance and Sexual Activity    Alcohol use: Yes     Comment: occasional    Drug use: Yes     Types: Marijuana     Comment: occasional    Sexual activity: Defer           Objective   Physical Exam  Vitals and nursing note reviewed.   Constitutional:       General: He is not in acute distress.     Appearance: Normal appearance. He is obese. He is not ill-appearing, toxic-appearing or diaphoretic.   HENT:      Head: Normocephalic and atraumatic.      Right Ear: Ear canal and external ear normal.      Left Ear: Ear canal and external ear normal.      Nose: Nose normal.      Mouth/Throat:      Mouth: Mucous membranes are moist.      Pharynx: Oropharynx is clear.   Eyes:      Extraocular Movements: Extraocular movements intact.      Conjunctiva/sclera: Conjunctivae normal.      Pupils: Pupils are equal, round, and reactive to light.   Cardiovascular:      Rate and Rhythm: Normal rate and regular rhythm.      Pulses: Normal  "pulses.      Heart sounds: Normal heart sounds.   Pulmonary:      Effort: Pulmonary effort is normal.      Breath sounds: Normal breath sounds.   Abdominal:      General: Bowel sounds are normal.      Palpations: Abdomen is soft.   Musculoskeletal:         General: Normal range of motion.      Cervical back: Normal range of motion and neck supple.   Skin:     General: Skin is warm and dry.      Capillary Refill: Capillary refill takes less than 2 seconds.   Neurological:      General: No focal deficit present.   Psychiatric:         Mood and Affect: Mood normal.         Behavior: Behavior normal.         Thought Content: Thought content normal.         Judgment: Judgment normal.         Procedures           ED Course      BP 99/58   Pulse 78   Temp 98.9 °F (37.2 °C) (Oral)   Resp 17   Ht 177.8 cm (70\")   Wt 107 kg (236 lb 12.4 oz)   SpO2 97%   BMI 33.97 kg/m²   Labs Reviewed   COMPREHENSIVE METABOLIC PANEL - Abnormal; Notable for the following components:       Result Value    Glucose 109 (*)     BUN 21 (*)     Sodium 132 (*)     CO2 21.9 (*)     All other components within normal limits    Narrative:     GFR Normal >60  Chronic Kidney Disease <60  Kidney Failure <15     URINALYSIS W/ MICROSCOPIC IF INDICATED (NO CULTURE) - Abnormal; Notable for the following components:    Color, UA Dark Yellow (*)     Blood, UA Large (3+) (*)     Protein, UA Trace (*)     Leuk Esterase, UA Trace (*)     Urobilinogen, UA 2.0 E.U./dL (*)     All other components within normal limits   CBC WITH AUTO DIFFERENTIAL - Abnormal; Notable for the following components:    WBC 11.18 (*)     Hemoglobin 12.9 (*)     Neutrophils, Absolute 7.41 (*)     Monocytes, Absolute 1.11 (*)     All other components within normal limits   URINALYSIS, MICROSCOPIC ONLY - Abnormal; Notable for the following components:    RBC, UA Too Numerous to Count (*)     All other components within normal limits   URINE CULTURE   RAINBOW DRAW    Narrative:     The " following orders were created for panel order [place] Draw.  Procedure                               Abnormality         Status                     ---------                               -----------         ------                     Green Top (Gel)[[id number]]                                  Final result               Lavender Top[[id number]]                                     Final result               Gold Top - SST[[id number]]                                   Final result               Light Blue Top[[id number]]                                   Final result                 Please view results for these tests on the individual orders.   CBC AND DIFFERENTIAL    Narrative:     The following orders were created for panel order CBC & Differential.  Procedure                               Abnormality         Status                     ---------                               -----------         ------                     CBC Auto Differential[[id number]]        Abnormal            Final result                 Please view results for these tests on the individual orders.   GREEN TOP   LAVENDER TOP   GOLD TOP - SST   LIGHT BLUE TOP     Medications   sodium chloride 0.9 % flush 10 mL (has no administration in time range)   lactated ringers bolus 1,000 mL (0 mL Intravenous Stopped 8/9/24 0248)     CT Abdomen Pelvis Stone Protocol    Result Date: 8/9/2024  Impression: 1.No acute intra-abdominal or intrapelvic process. Bilateral nonobstructing renal calculi present. 2.Ancillary findings as described above. Electronically Signed: Rosenane Bergman MD  8/9/2024 2:03 AM EDT  Workstation ID: [id number]                                          Medical Decision Making  Problems Addressed:  Dehydration: complicated acute illness or injury  Hematuria, unspecified type: complicated acute illness or injury    Amount and/or Complexity of Data Reviewed  Labs: ordered.  Radiology: ordered.    Risk  Prescription drug management.    Patient  presented with charley horses and bilateral flank pain.  History obtained from patient.    Labs reviewed:  Labs Reviewed   COMPREHENSIVE METABOLIC PANEL - Abnormal; Notable for the following components:       Result Value    Glucose 109 (*)     BUN 21 (*)     Sodium 132 (*)     CO2 21.9 (*)     All other components within normal limits    Narrative:     GFR Normal >60  Chronic Kidney Disease <60  Kidney Failure <15     URINALYSIS W/ MICROSCOPIC IF INDICATED (NO CULTURE) - Abnormal; Notable for the following components:    Color, UA Dark Yellow (*)     Blood, UA Large (3+) (*)     Protein, UA Trace (*)     Leuk Esterase, UA Trace (*)     Urobilinogen, UA 2.0 E.U./dL (*)     All other components within normal limits   CBC WITH AUTO DIFFERENTIAL - Abnormal; Notable for the following components:    WBC 11.18 (*)     Hemoglobin 12.9 (*)     Neutrophils, Absolute 7.41 (*)     Monocytes, Absolute 1.11 (*)     All other components within normal limits   URINALYSIS, MICROSCOPIC ONLY - Abnormal; Notable for the following components:    RBC, UA Too Numerous to Count (*)     All other components within normal limits   URINE CULTURE   RAINBOW DRAW    Narrative:     The following orders were created for panel order [place] Draw.  Procedure                               Abnormality         Status                     ---------                               -----------         ------                     Green Top (Gel)[[id number]]                                  Final result               Lavender Top[[id number]]                                     Final result               Gold Top - SST[[id number]]                                   Final result               Light Blue Top[[id number]]                                   Final result                 Please view results for these tests on the individual orders.   CBC AND DIFFERENTIAL    Narrative:     The following orders were created for panel order CBC & Differential.  Procedure                                Abnormality         Status                     ---------                               -----------         ------                     CBC Auto Differential[[id number]]        Abnormal            Final result                 Please view results for these tests on the individual orders.   GREEN TOP   LAVENDER TOP   GOLD TOP - SST   LIGHT BLUE TOP     Imaging reviewed:CT Abdomen Pelvis Stone Protocol    Result Date: 8/9/2024  Impression: 1.No acute intra-abdominal or intrapelvic process. Bilateral nonobstructing renal calculi present. 2.Ancillary findings as described above. Electronically Signed: Roseanne Bergman MD  8/9/2024 2:03 AM EDT  Workstation ID: [id number]     Differential diagnosis considered: Dehydration, kidney stone, urinary tract infection    Patient was treated with IV fluids and reported that the cramping has subsided.    IV established labs were obtained to show a white count of 11.18 hemoglobin of 12.9.  Most recent labs obtained were 9 months ago that showed hemoglobin of 13.5 however in the past year he has also had hemoglobins of 12.6 and 12.4.  CMP shows glucose 109 BUN 21 sodium 132.  Urine shows RBC too numerous to count is dark yellow with large blood trace protein trace leukocytes.  Urine sent for culture which is pending at time of discharge.  CT as above shows no acute intra-abdominal or intrapelvic process bilateral nonobstructing renal calculi.  Hematuria possible that he passed a recent stone which was causing some of his pain.    Upon reevaluation of patient sleeping and had to be awakened.  He is reporting resolution of symptoms and that his pain has improved with the fluids.  Patient educated on oral rehydration to include Pedialyte, sugar-free Gatorade, oral rehydration solutions, water.  Given urology and primary care follow-up.  Instructions for return to the ED.  Patient has no questions and is ready for discharge at this time.    Consideration was given for  admission, but the patient was stable for outpatient management as patient was ambulatory, nontoxic, stable, and afebrile.  Exam as above.    Disposition: Discussed need to follow-up diagnostics, including incidental findings.  Discharged with instructions to obtain outpatient follow-up with patient's symptoms and findings, with strict return precautions if patient develops new or worsening symptoms.    Final diagnoses:   Hematuria, unspecified type   Dehydration       ED Disposition  ED Disposition       ED Disposition   Discharge    Condition   Stable    Comment   --               Neftaly Brady MD  [facility] [address] [phone]    Schedule an appointment as soon as possible for a visit in 3 days      PATIENT CONNECTION - [facility] [address] [phone]    Follow-up with your primary care provider.  If you do not have one you can call this number and make an appointment.         Medication List      No changes were made to your prescriptions during this visit.            Whitley Thomas, APRN  08/09/24 0412

## 2024-08-09 NOTE — DISCHARGE INSTRUCTIONS
Drink enough clear fluid to keep your pee pale yellow. Drink fluids such as:  Water. Do not drink only water. Doing that can make the salt (sodium) level in your body get too low.  Water from ice chips you suck on.  Fruit juice that you have added water to (diluted).  Low-calorie sports drinks.  Eat foods that have the right amounts of salts and minerals, such as bananas, oranges, potatoes, tomatoes, or spinach.  Do not drink alcohol.  Avoid drinks that have caffeine or sugar. These include::  High-calorie sports drinks.  Fruit juice that you did not add water to.  Soda.  Coffee or energy drinks.  Avoid foods that are greasy or have a lot of fat or sugar.    Follow-up with primary care, call to make an appointment.    Make an appointment with urology, you may call the number below to make an appointment.    Return to the ED for any new or worsening symptoms.

## 2024-08-10 LAB — BACTERIA SPEC AEROBE CULT: NO GROWTH

## 2024-09-21 ENCOUNTER — HOSPITAL ENCOUNTER (OUTPATIENT)
Facility: HOSPITAL | Age: 54
Setting detail: OBSERVATION
Discharge: HOME | End: 2024-09-22
Attending: EMERGENCY MEDICINE | Admitting: EMERGENCY MEDICINE
Payer: MEDICAID

## 2024-09-21 ENCOUNTER — APPOINTMENT (OUTPATIENT)
Dept: GENERAL RADIOLOGY | Facility: HOSPITAL | Age: 54
End: 2024-09-21
Payer: MEDICAID

## 2024-09-21 DIAGNOSIS — R07.9 CHEST PAIN, UNSPECIFIED TYPE: Primary | ICD-10-CM

## 2024-09-21 LAB
ANION GAP SERPL CALCULATED.3IONS-SCNC: 12.6 MMOL/L (ref 5–15)
APTT PPP: 45.8 SECONDS (ref 61–76.5)
BASOPHILS # BLD AUTO: 0.09 10*3/MM3 (ref 0–0.2)
BASOPHILS NFR BLD AUTO: 0.6 % (ref 0–1.5)
BUN SERPL-MCNC: 35 MG/DL (ref 6–20)
BUN/CREAT SERPL: 27.8 (ref 7–25)
CALCIUM SPEC-SCNC: 9.6 MG/DL (ref 8.6–10.5)
CHLORIDE SERPL-SCNC: 94 MMOL/L (ref 98–107)
CO2 SERPL-SCNC: 21.4 MMOL/L (ref 22–29)
CREAT SERPL-MCNC: 1.26 MG/DL (ref 0.76–1.27)
DEPRECATED RDW RBC AUTO: 45.8 FL (ref 37–54)
EGFRCR SERPLBLD CKD-EPI 2021: 67.4 ML/MIN/1.73
EOSINOPHIL # BLD AUTO: 0.27 10*3/MM3 (ref 0–0.4)
EOSINOPHIL NFR BLD AUTO: 1.9 % (ref 0.3–6.2)
ERYTHROCYTE [DISTWIDTH] IN BLOOD BY AUTOMATED COUNT: 14.1 % (ref 12.3–15.4)
GLUCOSE SERPL-MCNC: 89 MG/DL (ref 65–99)
HCT VFR BLD AUTO: 42.5 % (ref 37.5–51)
HGB BLD-MCNC: 14.5 G/DL (ref 13–17.7)
HOLD SPECIMEN: NORMAL
HOLD SPECIMEN: NORMAL
IMM GRANULOCYTES # BLD AUTO: 0.08 10*3/MM3 (ref 0–0.05)
IMM GRANULOCYTES NFR BLD AUTO: 0.6 % (ref 0–0.5)
INR PPP: 1.09 (ref 0.93–1.1)
LYMPHOCYTES # BLD AUTO: 3.32 10*3/MM3 (ref 0.7–3.1)
LYMPHOCYTES NFR BLD AUTO: 23.7 % (ref 19.6–45.3)
MCH RBC QN AUTO: 29.8 PG (ref 26.6–33)
MCHC RBC AUTO-ENTMCNC: 34.1 G/DL (ref 31.5–35.7)
MCV RBC AUTO: 87.4 FL (ref 79–97)
MONOCYTES # BLD AUTO: 0.74 10*3/MM3 (ref 0.1–0.9)
MONOCYTES NFR BLD AUTO: 5.3 % (ref 5–12)
NEUTROPHILS NFR BLD AUTO: 67.9 % (ref 42.7–76)
NEUTROPHILS NFR BLD AUTO: 9.49 10*3/MM3 (ref 1.7–7)
NRBC BLD AUTO-RTO: 0 /100 WBC (ref 0–0.2)
NT-PROBNP SERPL-MCNC: 87 PG/ML (ref 0–900)
PLATELET # BLD AUTO: 311 10*3/MM3 (ref 140–450)
PMV BLD AUTO: 8.3 FL (ref 6–12)
POTASSIUM SERPL-SCNC: 4.5 MMOL/L (ref 3.5–5.2)
PROTHROMBIN TIME: 11.8 SECONDS (ref 9.6–11.7)
RBC # BLD AUTO: 4.86 10*6/MM3 (ref 4.14–5.8)
SODIUM SERPL-SCNC: 128 MMOL/L (ref 136–145)
TROPONIN T SERPL HS-MCNC: 8 NG/L
WBC NRBC COR # BLD AUTO: 13.99 10*3/MM3 (ref 3.4–10.8)
WHOLE BLOOD HOLD COAG: NORMAL
WHOLE BLOOD HOLD SPECIMEN: NORMAL

## 2024-09-21 PROCEDURE — 71045 X-RAY EXAM CHEST 1 VIEW: CPT

## 2024-09-21 PROCEDURE — G0378 HOSPITAL OBSERVATION PER HR: HCPCS

## 2024-09-21 PROCEDURE — 99214 OFFICE O/P EST MOD 30 MIN: CPT | Performed by: INTERNAL MEDICINE

## 2024-09-21 PROCEDURE — 25810000003 SODIUM CHLORIDE 0.9 % SOLUTION: Performed by: PHYSICIAN ASSISTANT

## 2024-09-21 PROCEDURE — 99285 EMERGENCY DEPT VISIT HI MDM: CPT

## 2024-09-21 PROCEDURE — 84484 ASSAY OF TROPONIN QUANT: CPT | Performed by: EMERGENCY MEDICINE

## 2024-09-21 PROCEDURE — 85025 COMPLETE CBC W/AUTO DIFF WBC: CPT | Performed by: EMERGENCY MEDICINE

## 2024-09-21 PROCEDURE — 85730 THROMBOPLASTIN TIME PARTIAL: CPT | Performed by: EMERGENCY MEDICINE

## 2024-09-21 PROCEDURE — 93005 ELECTROCARDIOGRAM TRACING: CPT

## 2024-09-21 PROCEDURE — 80048 BASIC METABOLIC PNL TOTAL CA: CPT | Performed by: EMERGENCY MEDICINE

## 2024-09-21 PROCEDURE — 83880 ASSAY OF NATRIURETIC PEPTIDE: CPT | Performed by: EMERGENCY MEDICINE

## 2024-09-21 PROCEDURE — 85610 PROTHROMBIN TIME: CPT | Performed by: EMERGENCY MEDICINE

## 2024-09-21 PROCEDURE — 93005 ELECTROCARDIOGRAM TRACING: CPT | Performed by: EMERGENCY MEDICINE

## 2024-09-21 RX ORDER — SODIUM CHLORIDE 0.9 % (FLUSH) 0.9 %
10 SYRINGE (ML) INJECTION AS NEEDED
Status: DISCONTINUED | OUTPATIENT
Start: 2024-09-21 | End: 2024-09-22 | Stop reason: HOSPADM

## 2024-09-21 RX ORDER — SODIUM CHLORIDE 9 MG/ML
50 INJECTION, SOLUTION INTRAVENOUS CONTINUOUS
Status: DISCONTINUED | OUTPATIENT
Start: 2024-09-21 | End: 2024-09-22 | Stop reason: HOSPADM

## 2024-09-21 RX ORDER — ATORVASTATIN CALCIUM 10 MG/1
10 TABLET, FILM COATED ORAL NIGHTLY
Status: DISCONTINUED | OUTPATIENT
Start: 2024-09-21 | End: 2024-09-22 | Stop reason: HOSPADM

## 2024-09-21 RX ORDER — OLANZAPINE 10 MG/1
20 TABLET ORAL NIGHTLY
Status: DISCONTINUED | OUTPATIENT
Start: 2024-09-21 | End: 2024-09-22 | Stop reason: HOSPADM

## 2024-09-21 RX ORDER — ASPIRIN 81 MG/1
81 TABLET ORAL DAILY
Status: DISCONTINUED | OUTPATIENT
Start: 2024-09-21 | End: 2024-09-22 | Stop reason: HOSPADM

## 2024-09-21 RX ORDER — TAMSULOSIN HYDROCHLORIDE 0.4 MG/1
0.4 CAPSULE ORAL DAILY
Status: DISCONTINUED | OUTPATIENT
Start: 2024-09-21 | End: 2024-09-22 | Stop reason: HOSPADM

## 2024-09-21 RX ORDER — METOPROLOL SUCCINATE 25 MG/1
25 TABLET, EXTENDED RELEASE ORAL
Status: DISCONTINUED | OUTPATIENT
Start: 2024-09-21 | End: 2024-09-22 | Stop reason: HOSPADM

## 2024-09-21 RX ORDER — CITALOPRAM HYDROBROMIDE 20 MG/1
20 TABLET ORAL DAILY
Status: DISCONTINUED | OUTPATIENT
Start: 2024-09-21 | End: 2024-09-22 | Stop reason: HOSPADM

## 2024-09-21 RX ORDER — ACETAMINOPHEN 500 MG
1000 TABLET ORAL ONCE
Status: COMPLETED | OUTPATIENT
Start: 2024-09-21 | End: 2024-09-21

## 2024-09-21 RX ADMIN — CITALOPRAM HYDROBROMIDE 20 MG: 20 TABLET ORAL at 21:06

## 2024-09-21 RX ADMIN — ASPIRIN 81 MG: 81 TABLET, COATED ORAL at 21:06

## 2024-09-21 RX ADMIN — TAMSULOSIN HYDROCHLORIDE 0.4 MG: 0.4 CAPSULE ORAL at 21:06

## 2024-09-21 RX ADMIN — ACETAMINOPHEN 1000 MG: 500 TABLET, FILM COATED ORAL at 15:27

## 2024-09-21 RX ADMIN — ATORVASTATIN CALCIUM 10 MG: 10 TABLET, FILM COATED ORAL at 21:06

## 2024-09-21 RX ADMIN — SODIUM CHLORIDE 50 ML/HR: 9 INJECTION, SOLUTION INTRAVENOUS at 15:22

## 2024-09-21 RX ADMIN — OLANZAPINE 20 MG: 10 TABLET, FILM COATED ORAL at 21:06

## 2024-09-21 RX ADMIN — METOPROLOL SUCCINATE 25 MG: 25 TABLET, EXTENDED RELEASE ORAL at 21:06

## 2024-09-22 ENCOUNTER — APPOINTMENT (OUTPATIENT)
Dept: CARDIOLOGY | Facility: HOSPITAL | Age: 54
End: 2024-09-22
Payer: MEDICAID

## 2024-09-22 VITALS
SYSTOLIC BLOOD PRESSURE: 114 MMHG | HEIGHT: 70 IN | DIASTOLIC BLOOD PRESSURE: 74 MMHG | HEART RATE: 78 BPM | RESPIRATION RATE: 16 BRPM | TEMPERATURE: 98.3 F | BODY MASS INDEX: 28.63 KG/M2 | WEIGHT: 200 LBS | OXYGEN SATURATION: 96 %

## 2024-09-22 LAB
ANION GAP SERPL CALCULATED.3IONS-SCNC: 9.5 MMOL/L (ref 5–15)
BASOPHILS # BLD AUTO: 0.13 10*3/MM3 (ref 0–0.2)
BASOPHILS NFR BLD AUTO: 1.6 % (ref 0–1.5)
BH CV ECHO LEFT VENTRICLE GLOBAL LONGITUDINAL STRAIN: -16.2 %
BH CV ECHO MEAS - ACS: 1.9 CM
BH CV ECHO MEAS - AO MAX PG: 13 MMHG
BH CV ECHO MEAS - AO MEAN PG: 7 MMHG
BH CV ECHO MEAS - AO ROOT DIAM: 3.4 CM
BH CV ECHO MEAS - AO V2 MAX: 180 CM/SEC
BH CV ECHO MEAS - AO V2 VTI: 37.1 CM
BH CV ECHO MEAS - AVA(I,D): 1.46 CM2
BH CV ECHO MEAS - EDV(CUBED): 110.6 ML
BH CV ECHO MEAS - EDV(MOD-SP4): 113 ML
BH CV ECHO MEAS - EF(MOD-BP): 65 %
BH CV ECHO MEAS - EF(MOD-SP4): 65.2 %
BH CV ECHO MEAS - ESV(CUBED): 68.9 ML
BH CV ECHO MEAS - ESV(MOD-SP4): 39.3 ML
BH CV ECHO MEAS - FS: 14.6 %
BH CV ECHO MEAS - IVS/LVPW: 1.44 CM
BH CV ECHO MEAS - IVSD: 1.3 CM
BH CV ECHO MEAS - LA DIMENSION: 4 CM
BH CV ECHO MEAS - LAT PEAK E' VEL: 10.3 CM/SEC
BH CV ECHO MEAS - LV DIASTOLIC VOL/BSA (35-75): 54.1 CM2
BH CV ECHO MEAS - LV MASS(C)D: 194 GRAMS
BH CV ECHO MEAS - LV MAX PG: 2.18 MMHG
BH CV ECHO MEAS - LV MEAN PG: 1 MMHG
BH CV ECHO MEAS - LV SYSTOLIC VOL/BSA (12-30): 18.8 CM2
BH CV ECHO MEAS - LV V1 MAX: 73.9 CM/SEC
BH CV ECHO MEAS - LV V1 VTI: 17.3 CM
BH CV ECHO MEAS - LVIDD: 4.8 CM
BH CV ECHO MEAS - LVIDS: 4.1 CM
BH CV ECHO MEAS - LVOT AREA: 3.1 CM2
BH CV ECHO MEAS - LVOT DIAM: 2 CM
BH CV ECHO MEAS - LVPWD: 0.9 CM
BH CV ECHO MEAS - MED PEAK E' VEL: 7.3 CM/SEC
BH CV ECHO MEAS - MV A MAX VEL: 91.7 CM/SEC
BH CV ECHO MEAS - MV DEC SLOPE: 363 CM/SEC2
BH CV ECHO MEAS - MV DEC TIME: 0.23 SEC
BH CV ECHO MEAS - MV E MAX VEL: 65.5 CM/SEC
BH CV ECHO MEAS - MV E/A: 0.71
BH CV ECHO MEAS - MV MAX PG: 4.8 MMHG
BH CV ECHO MEAS - MV MEAN PG: 2 MMHG
BH CV ECHO MEAS - MV P1/2T: 88.8 MSEC
BH CV ECHO MEAS - MV V2 VTI: 46.4 CM
BH CV ECHO MEAS - MVA(P1/2T): 2.48 CM2
BH CV ECHO MEAS - MVA(VTI): 1.17 CM2
BH CV ECHO MEAS - PA V2 MAX: 87.6 CM/SEC
BH CV ECHO MEAS - PULM A REVS DUR: 0.14 SEC
BH CV ECHO MEAS - PULM A REVS VEL: 27.8 CM/SEC
BH CV ECHO MEAS - PULM DIAS VEL: 49.7 CM/SEC
BH CV ECHO MEAS - PULM S/D: 1.35
BH CV ECHO MEAS - PULM SYS VEL: 67 CM/SEC
BH CV ECHO MEAS - RV MAX PG: 1.21 MMHG
BH CV ECHO MEAS - RV V1 MAX: 55.1 CM/SEC
BH CV ECHO MEAS - RV V1 VTI: 11.4 CM
BH CV ECHO MEAS - SV(LVOT): 54.3 ML
BH CV ECHO MEAS - SV(MOD-SP4): 73.7 ML
BH CV ECHO MEAS - SVI(LVOT): 26 ML/M2
BH CV ECHO MEAS - SVI(MOD-SP4): 35.3 ML/M2
BH CV ECHO MEAS - TAPSE (>1.6): 2.15 CM
BH CV ECHO MEASUREMENTS AVERAGE E/E' RATIO: 7.44
BH CV XLRA - TDI S': 15.3 CM/SEC
BUN SERPL-MCNC: 32 MG/DL (ref 6–20)
BUN/CREAT SERPL: 26.2 (ref 7–25)
CALCIUM SPEC-SCNC: 9.4 MG/DL (ref 8.6–10.5)
CHLORIDE SERPL-SCNC: 97 MMOL/L (ref 98–107)
CO2 SERPL-SCNC: 27.5 MMOL/L (ref 22–29)
CREAT SERPL-MCNC: 1.22 MG/DL (ref 0.76–1.27)
DEPRECATED RDW RBC AUTO: 48.9 FL (ref 37–54)
EGFRCR SERPLBLD CKD-EPI 2021: 70 ML/MIN/1.73
EOSINOPHIL # BLD AUTO: 0.48 10*3/MM3 (ref 0–0.4)
EOSINOPHIL NFR BLD AUTO: 5.9 % (ref 0.3–6.2)
ERYTHROCYTE [DISTWIDTH] IN BLOOD BY AUTOMATED COUNT: 14.6 % (ref 12.3–15.4)
GLUCOSE SERPL-MCNC: 100 MG/DL (ref 65–99)
HCT VFR BLD AUTO: 44.2 % (ref 37.5–51)
HGB BLD-MCNC: 14.5 G/DL (ref 13–17.7)
IMM GRANULOCYTES # BLD AUTO: 0.08 10*3/MM3 (ref 0–0.05)
IMM GRANULOCYTES NFR BLD AUTO: 1 % (ref 0–0.5)
LEFT ATRIUM VOLUME INDEX: 14.7 ML/M2
LYMPHOCYTES # BLD AUTO: 3.11 10*3/MM3 (ref 0.7–3.1)
LYMPHOCYTES NFR BLD AUTO: 38.1 % (ref 19.6–45.3)
MCH RBC QN AUTO: 29.8 PG (ref 26.6–33)
MCHC RBC AUTO-ENTMCNC: 32.8 G/DL (ref 31.5–35.7)
MCV RBC AUTO: 90.8 FL (ref 79–97)
MONOCYTES # BLD AUTO: 0.56 10*3/MM3 (ref 0.1–0.9)
MONOCYTES NFR BLD AUTO: 6.9 % (ref 5–12)
NEUTROPHILS NFR BLD AUTO: 3.8 10*3/MM3 (ref 1.7–7)
NEUTROPHILS NFR BLD AUTO: 46.5 % (ref 42.7–76)
NRBC BLD AUTO-RTO: 0 /100 WBC (ref 0–0.2)
PLATELET # BLD AUTO: 299 10*3/MM3 (ref 140–450)
PMV BLD AUTO: 8.3 FL (ref 6–12)
POTASSIUM SERPL-SCNC: 4.2 MMOL/L (ref 3.5–5.2)
QT INTERVAL: 393 MS
QTC INTERVAL: 452 MS
RBC # BLD AUTO: 4.87 10*6/MM3 (ref 4.14–5.8)
SINUS: 3.4 CM
SODIUM SERPL-SCNC: 134 MMOL/L (ref 136–145)
WBC NRBC COR # BLD AUTO: 8.16 10*3/MM3 (ref 3.4–10.8)

## 2024-09-22 PROCEDURE — 93306 TTE W/DOPPLER COMPLETE: CPT | Performed by: INTERNAL MEDICINE

## 2024-09-22 PROCEDURE — G0378 HOSPITAL OBSERVATION PER HR: HCPCS

## 2024-09-22 PROCEDURE — 93356 MYOCRD STRAIN IMG SPCKL TRCK: CPT | Performed by: INTERNAL MEDICINE

## 2024-09-22 PROCEDURE — 99214 OFFICE O/P EST MOD 30 MIN: CPT | Performed by: INTERNAL MEDICINE

## 2024-09-22 PROCEDURE — 93356 MYOCRD STRAIN IMG SPCKL TRCK: CPT

## 2024-09-22 PROCEDURE — 85025 COMPLETE CBC W/AUTO DIFF WBC: CPT | Performed by: EMERGENCY MEDICINE

## 2024-09-22 PROCEDURE — 93306 TTE W/DOPPLER COMPLETE: CPT

## 2024-09-22 PROCEDURE — 80048 BASIC METABOLIC PNL TOTAL CA: CPT | Performed by: EMERGENCY MEDICINE

## 2024-09-22 RX ADMIN — CITALOPRAM HYDROBROMIDE 20 MG: 20 TABLET ORAL at 09:35

## 2024-09-22 RX ADMIN — ASPIRIN 81 MG: 81 TABLET, COATED ORAL at 09:35

## 2024-09-22 RX ADMIN — TAMSULOSIN HYDROCHLORIDE 0.4 MG: 0.4 CAPSULE ORAL at 09:34

## 2024-09-23 ENCOUNTER — TELEPHONE (OUTPATIENT)
Dept: CARDIOLOGY | Facility: CLINIC | Age: 54
End: 2024-09-23
Payer: MEDICAID

## 2024-12-11 RX ORDER — METOPROLOL SUCCINATE 25 MG/1
25 TABLET, EXTENDED RELEASE ORAL
Qty: 10 TABLET | Refills: 0 | Status: SHIPPED | OUTPATIENT
Start: 2024-12-11

## 2024-12-11 RX ORDER — ATORVASTATIN CALCIUM 10 MG/1
10 TABLET, FILM COATED ORAL NIGHTLY
Qty: 10 TABLET | Refills: 0 | Status: SHIPPED | OUTPATIENT
Start: 2024-12-11

## 2024-12-11 RX ORDER — NITROGLYCERIN 0.4 MG/1
TABLET SUBLINGUAL
Qty: 25 TABLET | Refills: 0 | Status: SHIPPED | OUTPATIENT
Start: 2024-12-11

## 2024-12-11 RX ORDER — ASPIRIN 81 MG/1
81 TABLET, COATED ORAL DAILY
Qty: 10 TABLET | Refills: 0 | Status: SHIPPED | OUTPATIENT
Start: 2024-12-11

## 2024-12-11 NOTE — TELEPHONE ENCOUNTER
Rx Refill Note  Requested Prescriptions     Signed Prescriptions Disp Refills    metoprolol succinate XL (TOPROL-XL) 25 MG 24 hr tablet 10 tablet 0     Sig: Take 1 tablet by mouth Daily. Pt must call md office to make and keep appointment for further refills     Authorizing Provider: DANIELLE GARCIA     Ordering User: ALYSHA MONCADA    atorvastatin (LIPITOR) 10 MG tablet 10 tablet 0     Sig: Take 1 tablet by mouth Every Night. Pt must call md office to make and keep appointment for further refills     Authorizing Provider: DANIELLE GARCIA     Ordering User: ALYSHA MONCADA    aspirin (Aspirin Low Dose) 81 MG EC tablet 10 tablet 0     Sig: TAKE 1 TABLET BY MOUTH DAILY.     Authorizing Provider: DANIELLE GARCIA     Ordering User: ALYSHA MONCADA    nitroglycerin (NITROSTAT) 0.4 MG SL tablet 25 tablet 0     Sig: PLACE 1 TABLET UNDER THE TONGUE AS NEEDED FOR ANGINA, MAY REPEAT EVERY 5MINS FOR UP THREE DOSES Pt must call md office to make and keep appointment for further refills     Authorizing Provider: DANIELLE GARCIA     Ordering User: ALYSHA MONCADA      Last office visit with prescribing clinician: 12/5/2023   Last telemedicine visit with prescribing clinician: Visit date not found   Next office visit with prescribing clinician: Visit date not found                         Would you like a call back once the refill request has been completed: [] Yes [] No    If the office needs to give you a call back, can they leave a voicemail: [] Yes [] No    Alysha Moncada MA  12/11/24, 15:12 EST

## 2025-02-04 RX ORDER — METOPROLOL SUCCINATE 25 MG/1
25 TABLET, EXTENDED RELEASE ORAL DAILY
Qty: 10 TABLET | Refills: 0 | OUTPATIENT
Start: 2025-02-04

## 2025-02-04 RX ORDER — ASPIRIN 81 MG/1
81 TABLET, COATED ORAL DAILY
Qty: 10 TABLET | Refills: 0 | OUTPATIENT
Start: 2025-02-04

## 2025-02-04 NOTE — TELEPHONE ENCOUNTER
Rx Refill Note  Requested Prescriptions     Pending Prescriptions Disp Refills    Aspirin EC Adult Low Dose 81 MG EC tablet [Pharmacy Med Name: ASPIRIN EC ADULT LOW DOSE 81 MG TABLET] 10 tablet 0     Sig: TAKE 1 TABLET BY MOUTH DAILY.      Last office visit with prescribing clinician: 12/5/2023   Last telemedicine visit with prescribing clinician: Visit date not found   Next office visit with prescribing clinician: Visit date not found                         Would you like a call back once the refill request has been completed: [] Yes [] No    If the office needs to give you a call back, can they leave a voicemail: [] Yes [] No    Olivia Burdick MA  02/04/25, 09:36 EST

## 2025-02-04 NOTE — TELEPHONE ENCOUNTER
Rx Refill Note  Requested Prescriptions     Pending Prescriptions Disp Refills    metoprolol succinate XL (TOPROL-XL) 25 MG 24 hr tablet [Pharmacy Med Name: METOPROLOL SUCCINATE ER 25 MG TABLET] 10 tablet 0     Sig: TAKE 1 TABLET BY MOUTH DAILY.      Last office visit with prescribing clinician: 12/5/2023   Last telemedicine visit with prescribing clinician: Visit date not found   Next office visit with prescribing clinician: Visit date not found                         Would you like a call back once the refill request has been completed: [] Yes [] No    If the office needs to give you a call back, can they leave a voicemail: [] Yes [] No    Olivia Burdick MA  02/04/25, 09:27 EST